# Patient Record
Sex: MALE | Race: WHITE | NOT HISPANIC OR LATINO | Employment: OTHER | URBAN - METROPOLITAN AREA
[De-identification: names, ages, dates, MRNs, and addresses within clinical notes are randomized per-mention and may not be internally consistent; named-entity substitution may affect disease eponyms.]

---

## 2017-04-27 ENCOUNTER — HOSPITAL ENCOUNTER (EMERGENCY)
Facility: HOSPITAL | Age: 42
Discharge: HOME | End: 2017-04-27

## 2017-04-27 VITALS
HEART RATE: 92 BPM | TEMPERATURE: 98.6 F | OXYGEN SATURATION: 98 % | RESPIRATION RATE: 18 BRPM | DIASTOLIC BLOOD PRESSURE: 96 MMHG | SYSTOLIC BLOOD PRESSURE: 148 MMHG

## 2017-04-27 DIAGNOSIS — J40 BRONCHITIS: ICD-10-CM

## 2017-04-27 DIAGNOSIS — S51.811A LACERATION OF RIGHT FOREARM, INITIAL ENCOUNTER: Primary | ICD-10-CM

## 2017-04-27 PROCEDURE — 90471 IMMUNIZATION ADMIN: CPT

## 2017-04-27 PROCEDURE — 99283 EMERGENCY DEPT VISIT LOW MDM: CPT

## 2017-04-27 PROCEDURE — 90715 TDAP VACCINE 7 YRS/> IM: CPT | Performed by: EMERGENCY MEDICINE

## 2017-04-27 RX ORDER — ALBUTEROL SULFATE 90 UG/1
2 AEROSOL, METERED RESPIRATORY (INHALATION) ONCE
Status: COMPLETED | OUTPATIENT
Start: 2017-04-27 | End: 2017-04-27

## 2017-04-27 RX ADMIN — ALBUTEROL SULFATE 2 PUFF: 90 AEROSOL, METERED RESPIRATORY (INHALATION) at 02:38

## 2017-04-27 RX ADMIN — TETANUS TOXOID, REDUCED DIPHTHERIA TOXOID AND ACELLULAR PERTUSSIS VACCINE, ADSORBED 0.5 ML: 5; 2.5; 8; 8; 2.5 SUSPENSION INTRAMUSCULAR at 02:36

## 2020-01-30 PROCEDURE — 88304 TISSUE EXAM BY PATHOLOGIST: CPT | Performed by: PATHOLOGY

## 2020-01-31 ENCOUNTER — LAB REQUISITION (OUTPATIENT)
Dept: LAB | Facility: HOSPITAL | Age: 45
End: 2020-01-31
Payer: COMMERCIAL

## 2020-01-31 DIAGNOSIS — L72.0 EPIDERMAL CYST: ICD-10-CM

## 2020-05-25 ENCOUNTER — HOSPITAL ENCOUNTER (EMERGENCY)
Facility: HOSPITAL | Age: 45
Discharge: HOME | End: 2020-05-25
Attending: EMERGENCY MEDICINE | Admitting: EMERGENCY MEDICINE
Payer: COMMERCIAL

## 2020-05-25 VITALS
HEART RATE: 83 BPM | SYSTOLIC BLOOD PRESSURE: 151 MMHG | TEMPERATURE: 97.4 F | RESPIRATION RATE: 16 BRPM | OXYGEN SATURATION: 99 % | DIASTOLIC BLOOD PRESSURE: 73 MMHG | WEIGHT: 312.61 LBS

## 2020-05-25 DIAGNOSIS — Z76.0 MEDICATION REFILL: Primary | ICD-10-CM

## 2020-05-25 PROCEDURE — 99281 EMR DPT VST MAYX REQ PHY/QHP: CPT

## 2020-05-25 PROCEDURE — 99281 EMR DPT VST MAYX REQ PHY/QHP: CPT | Performed by: EMERGENCY MEDICINE

## 2020-05-25 RX ORDER — METHADONE HYDROCHLORIDE 10 MG/ML
160 CONCENTRATE ORAL ONCE
Status: COMPLETED | OUTPATIENT
Start: 2020-05-25 | End: 2020-05-25

## 2020-05-25 RX ADMIN — METHADONE HYDROCHLORIDE 160 MG: 10 CONCENTRATE ORAL at 11:01

## 2020-08-17 ENCOUNTER — TELEPHONE (OUTPATIENT)
Dept: PSYCHIATRY | Facility: CLINIC | Age: 45
End: 2020-08-17

## 2020-11-03 ENCOUNTER — APPOINTMENT (EMERGENCY)
Dept: RADIOLOGY | Facility: HOSPITAL | Age: 45
DRG: 603 | End: 2020-11-03
Payer: COMMERCIAL

## 2020-11-03 ENCOUNTER — HOSPITAL ENCOUNTER (INPATIENT)
Facility: HOSPITAL | Age: 45
LOS: 3 days | Discharge: HOME | DRG: 603 | End: 2020-11-06
Attending: EMERGENCY MEDICINE | Admitting: INTERNAL MEDICINE
Payer: COMMERCIAL

## 2020-11-03 DIAGNOSIS — L03.90 CELLULITIS: Primary | ICD-10-CM

## 2020-11-03 PROBLEM — F32.A DEPRESSION: Chronic | Status: ACTIVE | Noted: 2020-11-03

## 2020-11-03 PROBLEM — L03.116 CELLULITIS OF LEFT LOWER EXTREMITY: Status: ACTIVE | Noted: 2020-11-03

## 2020-11-03 PROBLEM — Z72.0 TOBACCO USE: Chronic | Status: ACTIVE | Noted: 2020-11-03

## 2020-11-03 PROBLEM — E78.5 HYPERLIPIDEMIA: Chronic | Status: ACTIVE | Noted: 2020-11-03

## 2020-11-03 PROBLEM — I10 ESSENTIAL HYPERTENSION: Chronic | Status: ACTIVE | Noted: 2020-11-03

## 2020-11-03 PROBLEM — E66.01 CLASS 3 SEVERE OBESITY IN ADULT (HCC): Chronic | Status: ACTIVE | Noted: 2020-11-03

## 2020-11-03 PROBLEM — F11.20 OPIOID DEPENDENCE (HCC): Chronic | Status: ACTIVE | Noted: 2020-11-03

## 2020-11-03 LAB
ALBUMIN SERPL BCP-MCNC: 3.8 G/DL (ref 3.5–5)
ALP SERPL-CCNC: 68 U/L (ref 46–116)
ALT SERPL W P-5'-P-CCNC: 66 U/L (ref 12–78)
ANION GAP SERPL CALCULATED.3IONS-SCNC: 11 MMOL/L (ref 4–13)
AST SERPL W P-5'-P-CCNC: 35 U/L (ref 5–45)
BASOPHILS # BLD AUTO: 0.11 THOUSANDS/ΜL (ref 0–0.1)
BASOPHILS NFR BLD AUTO: 1 % (ref 0–1)
BILIRUB SERPL-MCNC: 0.35 MG/DL (ref 0.2–1)
BUN SERPL-MCNC: 14 MG/DL (ref 5–25)
CALCIUM SERPL-MCNC: 9.2 MG/DL (ref 8.3–10.1)
CHLORIDE SERPL-SCNC: 101 MMOL/L (ref 100–108)
CO2 SERPL-SCNC: 28 MMOL/L (ref 21–32)
CREAT SERPL-MCNC: 0.82 MG/DL (ref 0.6–1.3)
EOSINOPHIL # BLD AUTO: 0.36 THOUSAND/ΜL (ref 0–0.61)
EOSINOPHIL NFR BLD AUTO: 3 % (ref 0–6)
ERYTHROCYTE [DISTWIDTH] IN BLOOD BY AUTOMATED COUNT: 13.7 % (ref 11.6–15.1)
GFR SERPL CREATININE-BSD FRML MDRD: 107 ML/MIN/1.73SQ M
GLUCOSE SERPL-MCNC: 142 MG/DL (ref 65–140)
HCT VFR BLD AUTO: 42.9 % (ref 36.5–49.3)
HGB BLD-MCNC: 13.4 G/DL (ref 12–17)
IMM GRANULOCYTES # BLD AUTO: 0.19 THOUSAND/UL (ref 0–0.2)
IMM GRANULOCYTES NFR BLD AUTO: 1 % (ref 0–2)
LYMPHOCYTES # BLD AUTO: 3.64 THOUSANDS/ΜL (ref 0.6–4.47)
LYMPHOCYTES NFR BLD AUTO: 28 % (ref 14–44)
MCH RBC QN AUTO: 29.1 PG (ref 26.8–34.3)
MCHC RBC AUTO-ENTMCNC: 31.2 G/DL (ref 31.4–37.4)
MCV RBC AUTO: 93 FL (ref 82–98)
MONOCYTES # BLD AUTO: 0.95 THOUSAND/ΜL (ref 0.17–1.22)
MONOCYTES NFR BLD AUTO: 7 % (ref 4–12)
NEUTROPHILS # BLD AUTO: 7.87 THOUSANDS/ΜL (ref 1.85–7.62)
NEUTS SEG NFR BLD AUTO: 60 % (ref 43–75)
NRBC BLD AUTO-RTO: 0 /100 WBCS
NT-PROBNP SERPL-MCNC: 50 PG/ML
PLATELET # BLD AUTO: 347 THOUSANDS/UL (ref 149–390)
PMV BLD AUTO: 11 FL (ref 8.9–12.7)
POTASSIUM SERPL-SCNC: 3.6 MMOL/L (ref 3.5–5.3)
PROT SERPL-MCNC: 8.3 G/DL (ref 6.4–8.2)
RBC # BLD AUTO: 4.6 MILLION/UL (ref 3.88–5.62)
SODIUM SERPL-SCNC: 140 MMOL/L (ref 136–145)
TROPONIN I SERPL-MCNC: <0.02 NG/ML
WBC # BLD AUTO: 13.12 THOUSAND/UL (ref 4.31–10.16)

## 2020-11-03 PROCEDURE — 99285 EMERGENCY DEPT VISIT HI MDM: CPT

## 2020-11-03 PROCEDURE — 99223 1ST HOSP IP/OBS HIGH 75: CPT | Performed by: PHYSICIAN ASSISTANT

## 2020-11-03 PROCEDURE — 85025 COMPLETE CBC W/AUTO DIFF WBC: CPT | Performed by: EMERGENCY MEDICINE

## 2020-11-03 PROCEDURE — 93005 ELECTROCARDIOGRAM TRACING: CPT

## 2020-11-03 PROCEDURE — 99285 EMERGENCY DEPT VISIT HI MDM: CPT | Performed by: EMERGENCY MEDICINE

## 2020-11-03 PROCEDURE — 83880 ASSAY OF NATRIURETIC PEPTIDE: CPT | Performed by: EMERGENCY MEDICINE

## 2020-11-03 PROCEDURE — 36415 COLL VENOUS BLD VENIPUNCTURE: CPT | Performed by: EMERGENCY MEDICINE

## 2020-11-03 PROCEDURE — 80053 COMPREHEN METABOLIC PANEL: CPT | Performed by: EMERGENCY MEDICINE

## 2020-11-03 PROCEDURE — 84484 ASSAY OF TROPONIN QUANT: CPT | Performed by: EMERGENCY MEDICINE

## 2020-11-03 PROCEDURE — 87040 BLOOD CULTURE FOR BACTERIA: CPT | Performed by: EMERGENCY MEDICINE

## 2020-11-03 PROCEDURE — 71045 X-RAY EXAM CHEST 1 VIEW: CPT

## 2020-11-03 RX ORDER — LABETALOL 100 MG/1
100 TABLET, FILM COATED ORAL EVERY 8 HOURS SCHEDULED
Status: DISCONTINUED | OUTPATIENT
Start: 2020-11-03 | End: 2020-11-06 | Stop reason: HOSPADM

## 2020-11-03 RX ORDER — FENOFIBRATE 145 MG/1
145 TABLET, COATED ORAL DAILY
Status: DISCONTINUED | OUTPATIENT
Start: 2020-11-04 | End: 2020-11-06 | Stop reason: HOSPADM

## 2020-11-03 RX ORDER — BUPROPION HYDROCHLORIDE 100 MG/1
300 TABLET ORAL ONCE
COMMUNITY
End: 2022-04-23

## 2020-11-03 RX ORDER — LABETALOL 100 MG/1
100 TABLET, FILM COATED ORAL 2 TIMES DAILY
COMMUNITY

## 2020-11-03 RX ORDER — FENOFIBRATE 145 MG/1
145 TABLET, COATED ORAL DAILY
COMMUNITY

## 2020-11-03 RX ORDER — METHADONE HYDROCHLORIDE 10 MG/ML
160 CONCENTRATE ORAL DAILY
Status: DISCONTINUED | OUTPATIENT
Start: 2020-11-04 | End: 2020-11-03

## 2020-11-03 RX ORDER — LOSARTAN POTASSIUM 100 MG/1
TABLET ORAL DAILY
COMMUNITY

## 2020-11-03 RX ORDER — AMLODIPINE BESYLATE AND ATORVASTATIN CALCIUM 10; 10 MG/1; MG/1
1 TABLET, FILM COATED ORAL DAILY
COMMUNITY

## 2020-11-03 RX ORDER — METHADONE HYDROCHLORIDE 10 MG/ML
160 CONCENTRATE ORAL DAILY
Status: DISCONTINUED | OUTPATIENT
Start: 2020-11-04 | End: 2020-11-06 | Stop reason: HOSPADM

## 2020-11-03 RX ORDER — HYDROCHLOROTHIAZIDE 25 MG/1
25 TABLET ORAL DAILY
COMMUNITY

## 2020-11-03 RX ORDER — CLINDAMYCIN HYDROCHLORIDE 300 MG/1
300 CAPSULE ORAL 4 TIMES DAILY
COMMUNITY
End: 2020-11-06 | Stop reason: HOSPADM

## 2020-11-03 RX ORDER — ONDANSETRON 2 MG/ML
4 INJECTION INTRAMUSCULAR; INTRAVENOUS EVERY 6 HOURS PRN
Status: DISCONTINUED | OUTPATIENT
Start: 2020-11-03 | End: 2020-11-06 | Stop reason: HOSPADM

## 2020-11-03 RX ORDER — HYDROCHLOROTHIAZIDE 25 MG/1
25 TABLET ORAL DAILY
Status: DISCONTINUED | OUTPATIENT
Start: 2020-11-04 | End: 2020-11-06 | Stop reason: HOSPADM

## 2020-11-03 RX ORDER — SODIUM CHLORIDE 9 MG/ML
125 INJECTION, SOLUTION INTRAVENOUS CONTINUOUS
Status: DISCONTINUED | OUTPATIENT
Start: 2020-11-03 | End: 2020-11-06

## 2020-11-03 RX ORDER — BUPROPION HYDROCHLORIDE 100 MG/1
300 TABLET ORAL ONCE
Status: DISCONTINUED | OUTPATIENT
Start: 2020-11-03 | End: 2020-11-03

## 2020-11-03 RX ORDER — LOSARTAN POTASSIUM 50 MG/1
100 TABLET ORAL DAILY
Status: DISCONTINUED | OUTPATIENT
Start: 2020-11-04 | End: 2020-11-06 | Stop reason: HOSPADM

## 2020-11-03 RX ORDER — BUPROPION HYDROCHLORIDE 150 MG/1
300 TABLET ORAL DAILY
Status: DISCONTINUED | OUTPATIENT
Start: 2020-11-04 | End: 2020-11-06 | Stop reason: HOSPADM

## 2020-11-03 RX ORDER — VENLAFAXINE HYDROCHLORIDE 150 MG/1
150 CAPSULE, EXTENDED RELEASE ORAL DAILY
COMMUNITY

## 2020-11-03 RX ORDER — ACETAMINOPHEN 325 MG/1
650 TABLET ORAL EVERY 6 HOURS PRN
Status: DISCONTINUED | OUTPATIENT
Start: 2020-11-03 | End: 2020-11-06 | Stop reason: HOSPADM

## 2020-11-03 RX ORDER — VENLAFAXINE HYDROCHLORIDE 75 MG/1
75 CAPSULE, EXTENDED RELEASE ORAL DAILY
COMMUNITY

## 2020-11-03 RX ORDER — METHADONE HYDROCHLORIDE 40 MG/1
160 TABLET ORAL ONCE
COMMUNITY

## 2020-11-03 RX ADMIN — CEFAZOLIN SODIUM 2000 MG: 10 INJECTION, POWDER, FOR SOLUTION INTRAVENOUS at 18:20

## 2020-11-03 RX ADMIN — SODIUM CHLORIDE 125 ML/HR: 0.9 INJECTION, SOLUTION INTRAVENOUS at 21:33

## 2020-11-03 RX ADMIN — ENOXAPARIN SODIUM 40 MG: 40 INJECTION SUBCUTANEOUS at 21:30

## 2020-11-03 RX ADMIN — LABETALOL HYDROCHLORIDE 100 MG: 100 TABLET, FILM COATED ORAL at 21:30

## 2020-11-04 PROBLEM — E78.5 DYSLIPIDEMIA: Status: ACTIVE | Noted: 2020-11-03

## 2020-11-04 LAB
ANION GAP SERPL CALCULATED.3IONS-SCNC: 9 MMOL/L (ref 4–13)
ATRIAL RATE: 81 BPM
BASOPHILS # BLD AUTO: 0.11 THOUSANDS/ΜL (ref 0–0.1)
BASOPHILS NFR BLD AUTO: 1 % (ref 0–1)
BUN SERPL-MCNC: 11 MG/DL (ref 5–25)
CALCIUM SERPL-MCNC: 8.7 MG/DL (ref 8.3–10.1)
CHLORIDE SERPL-SCNC: 103 MMOL/L (ref 100–108)
CO2 SERPL-SCNC: 29 MMOL/L (ref 21–32)
CREAT SERPL-MCNC: 0.81 MG/DL (ref 0.6–1.3)
EOSINOPHIL # BLD AUTO: 0.27 THOUSAND/ΜL (ref 0–0.61)
EOSINOPHIL NFR BLD AUTO: 3 % (ref 0–6)
ERYTHROCYTE [DISTWIDTH] IN BLOOD BY AUTOMATED COUNT: 13.7 % (ref 11.6–15.1)
GFR SERPL CREATININE-BSD FRML MDRD: 107 ML/MIN/1.73SQ M
GLUCOSE SERPL-MCNC: 129 MG/DL (ref 65–140)
HCT VFR BLD AUTO: 41 % (ref 36.5–49.3)
HGB BLD-MCNC: 12.8 G/DL (ref 12–17)
IMM GRANULOCYTES # BLD AUTO: 0.15 THOUSAND/UL (ref 0–0.2)
IMM GRANULOCYTES NFR BLD AUTO: 1 % (ref 0–2)
LYMPHOCYTES # BLD AUTO: 3.15 THOUSANDS/ΜL (ref 0.6–4.47)
LYMPHOCYTES NFR BLD AUTO: 29 % (ref 14–44)
MCH RBC QN AUTO: 29.2 PG (ref 26.8–34.3)
MCHC RBC AUTO-ENTMCNC: 31.2 G/DL (ref 31.4–37.4)
MCV RBC AUTO: 94 FL (ref 82–98)
MONOCYTES # BLD AUTO: 1 THOUSAND/ΜL (ref 0.17–1.22)
MONOCYTES NFR BLD AUTO: 9 % (ref 4–12)
NEUTROPHILS # BLD AUTO: 6.07 THOUSANDS/ΜL (ref 1.85–7.62)
NEUTS SEG NFR BLD AUTO: 57 % (ref 43–75)
NRBC BLD AUTO-RTO: 0 /100 WBCS
P AXIS: 39 DEGREES
PLATELET # BLD AUTO: 308 THOUSANDS/UL (ref 149–390)
PMV BLD AUTO: 11.5 FL (ref 8.9–12.7)
POTASSIUM SERPL-SCNC: 3.6 MMOL/L (ref 3.5–5.3)
PR INTERVAL: 144 MS
QRS AXIS: -4 DEGREES
QRSD INTERVAL: 98 MS
QT INTERVAL: 356 MS
QTC INTERVAL: 403 MS
RBC # BLD AUTO: 4.38 MILLION/UL (ref 3.88–5.62)
SODIUM SERPL-SCNC: 141 MMOL/L (ref 136–145)
T WAVE AXIS: 37 DEGREES
VENTRICULAR RATE: 77 BPM
WBC # BLD AUTO: 10.75 THOUSAND/UL (ref 4.31–10.16)

## 2020-11-04 PROCEDURE — 99232 SBSQ HOSP IP/OBS MODERATE 35: CPT | Performed by: INTERNAL MEDICINE

## 2020-11-04 PROCEDURE — 80048 BASIC METABOLIC PNL TOTAL CA: CPT | Performed by: PHYSICIAN ASSISTANT

## 2020-11-04 PROCEDURE — 93010 ELECTROCARDIOGRAM REPORT: CPT | Performed by: INTERNAL MEDICINE

## 2020-11-04 PROCEDURE — 85025 COMPLETE CBC W/AUTO DIFF WBC: CPT | Performed by: PHYSICIAN ASSISTANT

## 2020-11-04 RX ADMIN — METHADONE HYDROCHLORIDE 160 MG: 10 CONCENTRATE ORAL at 08:16

## 2020-11-04 RX ADMIN — AMLODIPINE BESYLATE: 10 TABLET ORAL at 08:19

## 2020-11-04 RX ADMIN — ACETAMINOPHEN 650 MG: 325 TABLET, FILM COATED ORAL at 00:33

## 2020-11-04 RX ADMIN — Medication 2000 MG: at 09:47

## 2020-11-04 RX ADMIN — LABETALOL HYDROCHLORIDE 100 MG: 100 TABLET, FILM COATED ORAL at 22:54

## 2020-11-04 RX ADMIN — Medication 2000 MG: at 18:04

## 2020-11-04 RX ADMIN — SODIUM CHLORIDE 125 ML/HR: 0.9 INJECTION, SOLUTION INTRAVENOUS at 05:34

## 2020-11-04 RX ADMIN — Medication 2000 MG: at 02:11

## 2020-11-04 RX ADMIN — ENOXAPARIN SODIUM 40 MG: 40 INJECTION SUBCUTANEOUS at 18:05

## 2020-11-04 RX ADMIN — LOSARTAN POTASSIUM 100 MG: 50 TABLET, FILM COATED ORAL at 08:19

## 2020-11-04 RX ADMIN — ENOXAPARIN SODIUM 40 MG: 40 INJECTION SUBCUTANEOUS at 08:18

## 2020-11-04 RX ADMIN — SODIUM CHLORIDE 125 ML/HR: 0.9 INJECTION, SOLUTION INTRAVENOUS at 14:08

## 2020-11-04 RX ADMIN — LABETALOL HYDROCHLORIDE 100 MG: 100 TABLET, FILM COATED ORAL at 05:41

## 2020-11-04 RX ADMIN — BUPROPION HYDROCHLORIDE 300 MG: 150 TABLET, FILM COATED, EXTENDED RELEASE ORAL at 08:20

## 2020-11-04 RX ADMIN — FENOFIBRATE 145 MG: 145 TABLET ORAL at 08:19

## 2020-11-04 RX ADMIN — HYDROCHLOROTHIAZIDE 25 MG: 25 TABLET ORAL at 08:20

## 2020-11-04 RX ADMIN — SODIUM CHLORIDE 125 ML/HR: 0.9 INJECTION, SOLUTION INTRAVENOUS at 22:57

## 2020-11-04 RX ADMIN — LABETALOL HYDROCHLORIDE 100 MG: 100 TABLET, FILM COATED ORAL at 14:09

## 2020-11-04 RX ADMIN — VENLAFAXINE HYDROCHLORIDE 225 MG: 37.5 CAPSULE, EXTENDED RELEASE ORAL at 08:20

## 2020-11-05 LAB
BASOPHILS # BLD AUTO: 0.09 THOUSANDS/ΜL (ref 0–0.1)
BASOPHILS NFR BLD AUTO: 1 % (ref 0–1)
EOSINOPHIL # BLD AUTO: 0.25 THOUSAND/ΜL (ref 0–0.61)
EOSINOPHIL NFR BLD AUTO: 3 % (ref 0–6)
ERYTHROCYTE [DISTWIDTH] IN BLOOD BY AUTOMATED COUNT: 13.6 % (ref 11.6–15.1)
HCT VFR BLD AUTO: 40.4 % (ref 36.5–49.3)
HGB BLD-MCNC: 12.4 G/DL (ref 12–17)
IMM GRANULOCYTES # BLD AUTO: 0.12 THOUSAND/UL (ref 0–0.2)
IMM GRANULOCYTES NFR BLD AUTO: 1 % (ref 0–2)
LYMPHOCYTES # BLD AUTO: 3.51 THOUSANDS/ΜL (ref 0.6–4.47)
LYMPHOCYTES NFR BLD AUTO: 37 % (ref 14–44)
MCH RBC QN AUTO: 28.7 PG (ref 26.8–34.3)
MCHC RBC AUTO-ENTMCNC: 30.7 G/DL (ref 31.4–37.4)
MCV RBC AUTO: 94 FL (ref 82–98)
MONOCYTES # BLD AUTO: 0.9 THOUSAND/ΜL (ref 0.17–1.22)
MONOCYTES NFR BLD AUTO: 10 % (ref 4–12)
NEUTROPHILS # BLD AUTO: 4.62 THOUSANDS/ΜL (ref 1.85–7.62)
NEUTS SEG NFR BLD AUTO: 48 % (ref 43–75)
NRBC BLD AUTO-RTO: 0 /100 WBCS
PLATELET # BLD AUTO: 311 THOUSANDS/UL (ref 149–390)
PMV BLD AUTO: 11.1 FL (ref 8.9–12.7)
RBC # BLD AUTO: 4.32 MILLION/UL (ref 3.88–5.62)
WBC # BLD AUTO: 9.49 THOUSAND/UL (ref 4.31–10.16)

## 2020-11-05 PROCEDURE — 99232 SBSQ HOSP IP/OBS MODERATE 35: CPT | Performed by: INTERNAL MEDICINE

## 2020-11-05 PROCEDURE — 85025 COMPLETE CBC W/AUTO DIFF WBC: CPT | Performed by: INTERNAL MEDICINE

## 2020-11-05 RX ADMIN — LABETALOL HYDROCHLORIDE 100 MG: 100 TABLET, FILM COATED ORAL at 21:51

## 2020-11-05 RX ADMIN — ENOXAPARIN SODIUM 40 MG: 40 INJECTION SUBCUTANEOUS at 17:21

## 2020-11-05 RX ADMIN — LABETALOL HYDROCHLORIDE 100 MG: 100 TABLET, FILM COATED ORAL at 06:08

## 2020-11-05 RX ADMIN — FENOFIBRATE 145 MG: 145 TABLET ORAL at 08:46

## 2020-11-05 RX ADMIN — VENLAFAXINE HYDROCHLORIDE 225 MG: 37.5 CAPSULE, EXTENDED RELEASE ORAL at 08:46

## 2020-11-05 RX ADMIN — AMLODIPINE BESYLATE: 10 TABLET ORAL at 08:45

## 2020-11-05 RX ADMIN — SODIUM CHLORIDE 125 ML/HR: 0.9 INJECTION, SOLUTION INTRAVENOUS at 16:28

## 2020-11-05 RX ADMIN — LOSARTAN POTASSIUM 100 MG: 50 TABLET, FILM COATED ORAL at 08:46

## 2020-11-05 RX ADMIN — BUPROPION HYDROCHLORIDE 300 MG: 150 TABLET, FILM COATED, EXTENDED RELEASE ORAL at 08:46

## 2020-11-05 RX ADMIN — LABETALOL HYDROCHLORIDE 100 MG: 100 TABLET, FILM COATED ORAL at 15:52

## 2020-11-05 RX ADMIN — ENOXAPARIN SODIUM 40 MG: 40 INJECTION SUBCUTANEOUS at 08:46

## 2020-11-05 RX ADMIN — Medication 2000 MG: at 12:32

## 2020-11-05 RX ADMIN — Medication 2000 MG: at 19:52

## 2020-11-05 RX ADMIN — SODIUM CHLORIDE 125 ML/HR: 0.9 INJECTION, SOLUTION INTRAVENOUS at 07:46

## 2020-11-05 RX ADMIN — HYDROCHLOROTHIAZIDE 25 MG: 25 TABLET ORAL at 08:46

## 2020-11-05 RX ADMIN — METHADONE HYDROCHLORIDE 160 MG: 10 CONCENTRATE ORAL at 08:46

## 2020-11-05 RX ADMIN — SODIUM CHLORIDE 125 ML/HR: 0.9 INJECTION, SOLUTION INTRAVENOUS at 23:59

## 2020-11-05 RX ADMIN — Medication 2000 MG: at 01:47

## 2020-11-06 VITALS
RESPIRATION RATE: 18 BRPM | WEIGHT: 315 LBS | SYSTOLIC BLOOD PRESSURE: 140 MMHG | BODY MASS INDEX: 44.1 KG/M2 | OXYGEN SATURATION: 95 % | HEART RATE: 63 BPM | TEMPERATURE: 98 F | DIASTOLIC BLOOD PRESSURE: 71 MMHG | HEIGHT: 71 IN

## 2020-11-06 LAB
BASOPHILS # BLD AUTO: 0.1 THOUSANDS/ΜL (ref 0–0.1)
BASOPHILS NFR BLD AUTO: 1 % (ref 0–1)
EOSINOPHIL # BLD AUTO: 0.27 THOUSAND/ΜL (ref 0–0.61)
EOSINOPHIL NFR BLD AUTO: 3 % (ref 0–6)
ERYTHROCYTE [DISTWIDTH] IN BLOOD BY AUTOMATED COUNT: 13.5 % (ref 11.6–15.1)
EST. AVERAGE GLUCOSE BLD GHB EST-MCNC: 154 MG/DL
HBA1C MFR BLD: 7 %
HCT VFR BLD AUTO: 39.1 % (ref 36.5–49.3)
HGB BLD-MCNC: 12.1 G/DL (ref 12–17)
IMM GRANULOCYTES # BLD AUTO: 0.11 THOUSAND/UL (ref 0–0.2)
IMM GRANULOCYTES NFR BLD AUTO: 1 % (ref 0–2)
LYMPHOCYTES # BLD AUTO: 3.91 THOUSANDS/ΜL (ref 0.6–4.47)
LYMPHOCYTES NFR BLD AUTO: 41 % (ref 14–44)
MCH RBC QN AUTO: 29.1 PG (ref 26.8–34.3)
MCHC RBC AUTO-ENTMCNC: 30.9 G/DL (ref 31.4–37.4)
MCV RBC AUTO: 94 FL (ref 82–98)
MONOCYTES # BLD AUTO: 0.9 THOUSAND/ΜL (ref 0.17–1.22)
MONOCYTES NFR BLD AUTO: 9 % (ref 4–12)
NEUTROPHILS # BLD AUTO: 4.3 THOUSANDS/ΜL (ref 1.85–7.62)
NEUTS SEG NFR BLD AUTO: 45 % (ref 43–75)
NRBC BLD AUTO-RTO: 0 /100 WBCS
PLATELET # BLD AUTO: 310 THOUSANDS/UL (ref 149–390)
PMV BLD AUTO: 10.9 FL (ref 8.9–12.7)
RBC # BLD AUTO: 4.16 MILLION/UL (ref 3.88–5.62)
WBC # BLD AUTO: 9.59 THOUSAND/UL (ref 4.31–10.16)

## 2020-11-06 PROCEDURE — 85025 COMPLETE CBC W/AUTO DIFF WBC: CPT | Performed by: INTERNAL MEDICINE

## 2020-11-06 PROCEDURE — 99239 HOSP IP/OBS DSCHRG MGMT >30: CPT | Performed by: INTERNAL MEDICINE

## 2020-11-06 PROCEDURE — 83036 HEMOGLOBIN GLYCOSYLATED A1C: CPT | Performed by: INTERNAL MEDICINE

## 2020-11-06 RX ORDER — CEPHALEXIN 500 MG/1
1000 CAPSULE ORAL EVERY 6 HOURS SCHEDULED
Qty: 28 CAPSULE | Refills: 0 | Status: SHIPPED | OUTPATIENT
Start: 2020-11-06 | End: 2020-11-13

## 2020-11-06 RX ORDER — CEPHALEXIN 500 MG/1
500 CAPSULE ORAL EVERY 6 HOURS SCHEDULED
Qty: 28 CAPSULE | Refills: 0 | Status: SHIPPED | OUTPATIENT
Start: 2020-11-06 | End: 2020-11-06

## 2020-11-06 RX ORDER — CEPHALEXIN 500 MG/1
500 CAPSULE ORAL EVERY 6 HOURS SCHEDULED
Status: DISCONTINUED | OUTPATIENT
Start: 2020-11-06 | End: 2020-11-06 | Stop reason: HOSPADM

## 2020-11-06 RX ADMIN — SODIUM CHLORIDE 125 ML/HR: 0.9 INJECTION, SOLUTION INTRAVENOUS at 10:57

## 2020-11-06 RX ADMIN — FENOFIBRATE 145 MG: 145 TABLET ORAL at 09:37

## 2020-11-06 RX ADMIN — BUPROPION HYDROCHLORIDE 300 MG: 150 TABLET, FILM COATED, EXTENDED RELEASE ORAL at 09:37

## 2020-11-06 RX ADMIN — HYDROCHLOROTHIAZIDE 25 MG: 25 TABLET ORAL at 09:37

## 2020-11-06 RX ADMIN — AMLODIPINE BESYLATE: 10 TABLET ORAL at 09:37

## 2020-11-06 RX ADMIN — VENLAFAXINE HYDROCHLORIDE 225 MG: 37.5 CAPSULE, EXTENDED RELEASE ORAL at 09:37

## 2020-11-06 RX ADMIN — METHADONE HYDROCHLORIDE 160 MG: 10 CONCENTRATE ORAL at 09:37

## 2020-11-06 RX ADMIN — Medication 2000 MG: at 12:20

## 2020-11-06 RX ADMIN — LABETALOL HYDROCHLORIDE 100 MG: 100 TABLET, FILM COATED ORAL at 05:43

## 2020-11-06 RX ADMIN — ENOXAPARIN SODIUM 40 MG: 40 INJECTION SUBCUTANEOUS at 09:37

## 2020-11-06 RX ADMIN — Medication 2000 MG: at 04:34

## 2020-11-06 RX ADMIN — LOSARTAN POTASSIUM 100 MG: 50 TABLET, FILM COATED ORAL at 09:37

## 2020-11-08 LAB
BACTERIA BLD CULT: NORMAL
BACTERIA BLD CULT: NORMAL

## 2020-11-17 ENCOUNTER — HOSPITAL ENCOUNTER (INPATIENT)
Facility: HOSPITAL | Age: 45
LOS: 3 days | Discharge: HOME | DRG: 872 | End: 2020-11-20
Attending: EMERGENCY MEDICINE | Admitting: HOSPITALIST
Payer: COMMERCIAL

## 2020-11-17 ENCOUNTER — APPOINTMENT (INPATIENT)
Dept: ULTRASOUND IMAGING | Facility: HOSPITAL | Age: 45
DRG: 872 | End: 2020-11-17
Payer: COMMERCIAL

## 2020-11-17 DIAGNOSIS — A41.9 SEPSIS, DUE TO UNSPECIFIED ORGANISM, UNSPECIFIED WHETHER ACUTE ORGAN DYSFUNCTION PRESENT (HCC): Primary | ICD-10-CM

## 2020-11-17 DIAGNOSIS — L84 CALLUS OF HEEL: ICD-10-CM

## 2020-11-17 DIAGNOSIS — L03.116 CELLULITIS OF LEFT LOWER EXTREMITY: ICD-10-CM

## 2020-11-17 DIAGNOSIS — L03.116 LEFT LEG CELLULITIS: ICD-10-CM

## 2020-11-17 PROBLEM — E11.9 TYPE 2 DIABETES MELLITUS, WITHOUT LONG-TERM CURRENT USE OF INSULIN (HCC): Status: ACTIVE | Noted: 2020-11-17

## 2020-11-17 PROBLEM — E66.01 CLASS 3 SEVERE OBESITY WITH BODY MASS INDEX (BMI) OF 40.0 TO 44.9 IN ADULT (HCC): Status: ACTIVE | Noted: 2020-11-03

## 2020-11-17 PROBLEM — I10 ESSENTIAL HYPERTENSION: Status: ACTIVE | Noted: 2020-11-03

## 2020-11-17 LAB
ALBUMIN SERPL BCP-MCNC: 3.9 G/DL (ref 3.5–5)
ALP SERPL-CCNC: 69 U/L (ref 46–116)
ALT SERPL W P-5'-P-CCNC: 65 U/L (ref 12–78)
ANION GAP SERPL CALCULATED.3IONS-SCNC: 11 MMOL/L (ref 4–13)
APTT PPP: 42 SECONDS (ref 23–37)
AST SERPL W P-5'-P-CCNC: 37 U/L (ref 5–45)
BASOPHILS # BLD AUTO: 0.11 THOUSANDS/ΜL (ref 0–0.1)
BASOPHILS NFR BLD AUTO: 1 % (ref 0–1)
BILIRUB SERPL-MCNC: 0.47 MG/DL (ref 0.2–1)
BUN SERPL-MCNC: 12 MG/DL (ref 5–25)
CALCIUM SERPL-MCNC: 9.1 MG/DL (ref 8.3–10.1)
CHLORIDE SERPL-SCNC: 102 MMOL/L (ref 100–108)
CO2 SERPL-SCNC: 29 MMOL/L (ref 21–32)
CREAT SERPL-MCNC: 0.86 MG/DL (ref 0.6–1.3)
EOSINOPHIL # BLD AUTO: 0.26 THOUSAND/ΜL (ref 0–0.61)
EOSINOPHIL NFR BLD AUTO: 2 % (ref 0–6)
ERYTHROCYTE [DISTWIDTH] IN BLOOD BY AUTOMATED COUNT: 13.4 % (ref 11.6–15.1)
GFR SERPL CREATININE-BSD FRML MDRD: 105 ML/MIN/1.73SQ M
GLUCOSE SERPL-MCNC: 152 MG/DL (ref 65–140)
HCT VFR BLD AUTO: 42.3 % (ref 36.5–49.3)
HGB BLD-MCNC: 13.1 G/DL (ref 12–17)
IMM GRANULOCYTES # BLD AUTO: 0.16 THOUSAND/UL (ref 0–0.2)
IMM GRANULOCYTES NFR BLD AUTO: 1 % (ref 0–2)
INR PPP: 1.08 (ref 0.84–1.19)
LACTATE SERPL-SCNC: 1.5 MMOL/L (ref 0.5–2)
LYMPHOCYTES # BLD AUTO: 3.13 THOUSANDS/ΜL (ref 0.6–4.47)
LYMPHOCYTES NFR BLD AUTO: 22 % (ref 14–44)
MCH RBC QN AUTO: 28.6 PG (ref 26.8–34.3)
MCHC RBC AUTO-ENTMCNC: 31 G/DL (ref 31.4–37.4)
MCV RBC AUTO: 92 FL (ref 82–98)
MONOCYTES # BLD AUTO: 0.88 THOUSAND/ΜL (ref 0.17–1.22)
MONOCYTES NFR BLD AUTO: 6 % (ref 4–12)
NEUTROPHILS # BLD AUTO: 9.69 THOUSANDS/ΜL (ref 1.85–7.62)
NEUTS SEG NFR BLD AUTO: 68 % (ref 43–75)
NRBC BLD AUTO-RTO: 0 /100 WBCS
PLATELET # BLD AUTO: 387 THOUSANDS/UL (ref 149–390)
PMV BLD AUTO: 10.9 FL (ref 8.9–12.7)
POTASSIUM SERPL-SCNC: 3.6 MMOL/L (ref 3.5–5.3)
PROT SERPL-MCNC: 8.3 G/DL (ref 6.4–8.2)
PROTHROMBIN TIME: 14.1 SECONDS (ref 11.6–14.5)
RBC # BLD AUTO: 4.58 MILLION/UL (ref 3.88–5.62)
SODIUM SERPL-SCNC: 142 MMOL/L (ref 136–145)
WBC # BLD AUTO: 14.23 THOUSAND/UL (ref 4.31–10.16)

## 2020-11-17 PROCEDURE — 85025 COMPLETE CBC W/AUTO DIFF WBC: CPT | Performed by: EMERGENCY MEDICINE

## 2020-11-17 PROCEDURE — 85730 THROMBOPLASTIN TIME PARTIAL: CPT | Performed by: EMERGENCY MEDICINE

## 2020-11-17 PROCEDURE — 85610 PROTHROMBIN TIME: CPT | Performed by: EMERGENCY MEDICINE

## 2020-11-17 PROCEDURE — 99285 EMERGENCY DEPT VISIT HI MDM: CPT

## 2020-11-17 PROCEDURE — 83605 ASSAY OF LACTIC ACID: CPT | Performed by: EMERGENCY MEDICINE

## 2020-11-17 PROCEDURE — 80053 COMPREHEN METABOLIC PANEL: CPT | Performed by: EMERGENCY MEDICINE

## 2020-11-17 PROCEDURE — 36415 COLL VENOUS BLD VENIPUNCTURE: CPT | Performed by: EMERGENCY MEDICINE

## 2020-11-17 PROCEDURE — 99285 EMERGENCY DEPT VISIT HI MDM: CPT | Performed by: EMERGENCY MEDICINE

## 2020-11-17 PROCEDURE — 93971 EXTREMITY STUDY: CPT

## 2020-11-17 PROCEDURE — 87040 BLOOD CULTURE FOR BACTERIA: CPT | Performed by: EMERGENCY MEDICINE

## 2020-11-17 PROCEDURE — 96365 THER/PROPH/DIAG IV INF INIT: CPT

## 2020-11-17 PROCEDURE — 93971 EXTREMITY STUDY: CPT | Performed by: SURGERY

## 2020-11-17 RX ORDER — HYDROCHLOROTHIAZIDE 25 MG/1
25 TABLET ORAL DAILY
Status: DISCONTINUED | OUTPATIENT
Start: 2020-11-18 | End: 2020-11-20 | Stop reason: HOSPADM

## 2020-11-17 RX ORDER — FENOFIBRATE 145 MG/1
145 TABLET, COATED ORAL DAILY
Status: DISCONTINUED | OUTPATIENT
Start: 2020-11-18 | End: 2020-11-20 | Stop reason: HOSPADM

## 2020-11-17 RX ORDER — CEFAZOLIN SODIUM 2 G/50ML
2000 SOLUTION INTRAVENOUS EVERY 8 HOURS
Status: DISCONTINUED | OUTPATIENT
Start: 2020-11-18 | End: 2020-11-18

## 2020-11-17 RX ORDER — LABETALOL 100 MG/1
100 TABLET, FILM COATED ORAL 3 TIMES DAILY
Status: DISCONTINUED | OUTPATIENT
Start: 2020-11-17 | End: 2020-11-20 | Stop reason: HOSPADM

## 2020-11-17 RX ORDER — NICOTINE 21 MG/24HR
1 PATCH, TRANSDERMAL 24 HOURS TRANSDERMAL DAILY
Status: DISCONTINUED | OUTPATIENT
Start: 2020-11-18 | End: 2020-11-20 | Stop reason: HOSPADM

## 2020-11-17 RX ORDER — LOSARTAN POTASSIUM 50 MG/1
100 TABLET ORAL DAILY
Status: DISCONTINUED | OUTPATIENT
Start: 2020-11-18 | End: 2020-11-20 | Stop reason: HOSPADM

## 2020-11-17 RX ORDER — BUPROPION HYDROCHLORIDE 150 MG/1
300 TABLET ORAL DAILY
Status: DISCONTINUED | OUTPATIENT
Start: 2020-11-18 | End: 2020-11-20 | Stop reason: HOSPADM

## 2020-11-17 RX ORDER — METHADONE HYDROCHLORIDE 10 MG/ML
160 CONCENTRATE ORAL DAILY
Status: DISCONTINUED | OUTPATIENT
Start: 2020-11-18 | End: 2020-11-20 | Stop reason: HOSPADM

## 2020-11-17 RX ORDER — VENLAFAXINE HYDROCHLORIDE 37.5 MG/1
75 CAPSULE, EXTENDED RELEASE ORAL DAILY
Status: DISCONTINUED | OUTPATIENT
Start: 2020-11-18 | End: 2020-11-20 | Stop reason: HOSPADM

## 2020-11-17 RX ORDER — VENLAFAXINE HYDROCHLORIDE 150 MG/1
150 CAPSULE, EXTENDED RELEASE ORAL DAILY
Status: DISCONTINUED | OUTPATIENT
Start: 2020-11-18 | End: 2020-11-20 | Stop reason: HOSPADM

## 2020-11-17 RX ADMIN — CEFTRIAXONE 1000 MG: 10 INJECTION, POWDER, FOR SOLUTION INTRAVENOUS at 17:33

## 2020-11-18 ENCOUNTER — APPOINTMENT (INPATIENT)
Dept: RADIOLOGY | Facility: HOSPITAL | Age: 45
DRG: 872 | End: 2020-11-18
Payer: COMMERCIAL

## 2020-11-18 PROBLEM — A41.9 SEPSIS (HCC): Status: ACTIVE | Noted: 2020-11-18

## 2020-11-18 LAB
ANION GAP SERPL CALCULATED.3IONS-SCNC: 8 MMOL/L (ref 4–13)
BASOPHILS # BLD AUTO: 0.1 THOUSANDS/ΜL (ref 0–0.1)
BASOPHILS NFR BLD AUTO: 1 % (ref 0–1)
BUN SERPL-MCNC: 14 MG/DL (ref 5–25)
CALCIUM SERPL-MCNC: 8.9 MG/DL (ref 8.3–10.1)
CHLORIDE SERPL-SCNC: 102 MMOL/L (ref 100–108)
CO2 SERPL-SCNC: 30 MMOL/L (ref 21–32)
CREAT SERPL-MCNC: 0.88 MG/DL (ref 0.6–1.3)
EOSINOPHIL # BLD AUTO: 0.29 THOUSAND/ΜL (ref 0–0.61)
EOSINOPHIL NFR BLD AUTO: 3 % (ref 0–6)
ERYTHROCYTE [DISTWIDTH] IN BLOOD BY AUTOMATED COUNT: 13.5 % (ref 11.6–15.1)
GFR SERPL CREATININE-BSD FRML MDRD: 104 ML/MIN/1.73SQ M
GLUCOSE SERPL-MCNC: 100 MG/DL (ref 65–140)
GLUCOSE SERPL-MCNC: 120 MG/DL (ref 65–140)
GLUCOSE SERPL-MCNC: 127 MG/DL (ref 65–140)
GLUCOSE SERPL-MCNC: 128 MG/DL (ref 65–140)
GLUCOSE SERPL-MCNC: 134 MG/DL (ref 65–140)
GLUCOSE SERPL-MCNC: 157 MG/DL (ref 65–140)
GLUCOSE SERPL-MCNC: 61 MG/DL (ref 65–140)
HCT VFR BLD AUTO: 40.6 % (ref 36.5–49.3)
HGB BLD-MCNC: 12.5 G/DL (ref 12–17)
IMM GRANULOCYTES # BLD AUTO: 0.12 THOUSAND/UL (ref 0–0.2)
IMM GRANULOCYTES NFR BLD AUTO: 1 % (ref 0–2)
LYMPHOCYTES # BLD AUTO: 3.42 THOUSANDS/ΜL (ref 0.6–4.47)
LYMPHOCYTES NFR BLD AUTO: 30 % (ref 14–44)
MCH RBC QN AUTO: 29 PG (ref 26.8–34.3)
MCHC RBC AUTO-ENTMCNC: 30.8 G/DL (ref 31.4–37.4)
MCV RBC AUTO: 94 FL (ref 82–98)
MONOCYTES # BLD AUTO: 0.96 THOUSAND/ΜL (ref 0.17–1.22)
MONOCYTES NFR BLD AUTO: 9 % (ref 4–12)
NEUTROPHILS # BLD AUTO: 6.45 THOUSANDS/ΜL (ref 1.85–7.62)
NEUTS SEG NFR BLD AUTO: 56 % (ref 43–75)
NRBC BLD AUTO-RTO: 0 /100 WBCS
PLATELET # BLD AUTO: 352 THOUSANDS/UL (ref 149–390)
PMV BLD AUTO: 11.2 FL (ref 8.9–12.7)
POTASSIUM SERPL-SCNC: 3.2 MMOL/L (ref 3.5–5.3)
RBC # BLD AUTO: 4.31 MILLION/UL (ref 3.88–5.62)
SODIUM SERPL-SCNC: 140 MMOL/L (ref 136–145)
WBC # BLD AUTO: 11.34 THOUSAND/UL (ref 4.31–10.16)

## 2020-11-18 PROCEDURE — 80048 BASIC METABOLIC PNL TOTAL CA: CPT | Performed by: INTERNAL MEDICINE

## 2020-11-18 PROCEDURE — 73620 X-RAY EXAM OF FOOT: CPT

## 2020-11-18 PROCEDURE — 99222 1ST HOSP IP/OBS MODERATE 55: CPT | Performed by: INTERNAL MEDICINE

## 2020-11-18 PROCEDURE — 85025 COMPLETE CBC W/AUTO DIFF WBC: CPT | Performed by: INTERNAL MEDICINE

## 2020-11-18 PROCEDURE — 82948 REAGENT STRIP/BLOOD GLUCOSE: CPT

## 2020-11-18 PROCEDURE — 99222 1ST HOSP IP/OBS MODERATE 55: CPT | Performed by: PODIATRIST

## 2020-11-18 PROCEDURE — 73600 X-RAY EXAM OF ANKLE: CPT

## 2020-11-18 PROCEDURE — 87081 CULTURE SCREEN ONLY: CPT | Performed by: PHYSICIAN ASSISTANT

## 2020-11-18 RX ORDER — AMMONIUM LACTATE 12 G/100G
CREAM TOPICAL 2 TIMES DAILY
Status: DISCONTINUED | OUTPATIENT
Start: 2020-11-18 | End: 2020-11-20 | Stop reason: HOSPADM

## 2020-11-18 RX ORDER — POTASSIUM CHLORIDE 20 MEQ/1
40 TABLET, EXTENDED RELEASE ORAL 2 TIMES DAILY
Status: DISCONTINUED | OUTPATIENT
Start: 2020-11-18 | End: 2020-11-18

## 2020-11-18 RX ORDER — POTASSIUM CHLORIDE 20 MEQ/1
40 TABLET, EXTENDED RELEASE ORAL 2 TIMES DAILY
Status: COMPLETED | OUTPATIENT
Start: 2020-11-18 | End: 2020-11-18

## 2020-11-18 RX ADMIN — VANCOMYCIN HYDROCHLORIDE 2000 MG: 5 INJECTION, POWDER, LYOPHILIZED, FOR SOLUTION INTRAVENOUS at 15:37

## 2020-11-18 RX ADMIN — AMLODIPINE BESYLATE: 10 TABLET ORAL at 08:48

## 2020-11-18 RX ADMIN — Medication 1 PATCH: at 08:49

## 2020-11-18 RX ADMIN — HYDROCHLOROTHIAZIDE 25 MG: 25 TABLET ORAL at 08:48

## 2020-11-18 RX ADMIN — CEFAZOLIN SODIUM 2000 MG: 2 SOLUTION INTRAVENOUS at 00:33

## 2020-11-18 RX ADMIN — FENOFIBRATE 145 MG: 145 TABLET ORAL at 08:48

## 2020-11-18 RX ADMIN — POTASSIUM CHLORIDE 40 MEQ: 1500 TABLET, EXTENDED RELEASE ORAL at 16:55

## 2020-11-18 RX ADMIN — Medication: at 08:50

## 2020-11-18 RX ADMIN — CEFAZOLIN SODIUM 2000 MG: 2 SOLUTION INTRAVENOUS at 08:50

## 2020-11-18 RX ADMIN — Medication 1 APPLICATION: at 16:56

## 2020-11-18 RX ADMIN — LABETALOL HYDROCHLORIDE 100 MG: 100 TABLET ORAL at 16:55

## 2020-11-18 RX ADMIN — ENOXAPARIN SODIUM 40 MG: 40 INJECTION SUBCUTANEOUS at 08:47

## 2020-11-18 RX ADMIN — LABETALOL HYDROCHLORIDE 100 MG: 100 TABLET ORAL at 21:11

## 2020-11-18 RX ADMIN — METHADONE HYDROCHLORIDE 160 MG: 10 CONCENTRATE ORAL at 08:47

## 2020-11-18 RX ADMIN — VENLAFAXINE HYDROCHLORIDE 75 MG: 37.5 CAPSULE, EXTENDED RELEASE ORAL at 08:48

## 2020-11-18 RX ADMIN — LABETALOL HYDROCHLORIDE 100 MG: 100 TABLET ORAL at 00:32

## 2020-11-18 RX ADMIN — LABETALOL HYDROCHLORIDE 100 MG: 100 TABLET ORAL at 08:48

## 2020-11-18 RX ADMIN — VENLAFAXINE HYDROCHLORIDE 150 MG: 150 CAPSULE, EXTENDED RELEASE ORAL at 08:49

## 2020-11-18 RX ADMIN — LOSARTAN POTASSIUM 100 MG: 50 TABLET, FILM COATED ORAL at 08:48

## 2020-11-18 RX ADMIN — POTASSIUM CHLORIDE 40 MEQ: 1500 TABLET, EXTENDED RELEASE ORAL at 08:52

## 2020-11-18 RX ADMIN — BUPROPION HYDROCHLORIDE 300 MG: 150 TABLET, FILM COATED, EXTENDED RELEASE ORAL at 08:47

## 2020-11-19 LAB
ANION GAP SERPL CALCULATED.3IONS-SCNC: 6 MMOL/L (ref 4–13)
BUN SERPL-MCNC: 16 MG/DL (ref 5–25)
CALCIUM SERPL-MCNC: 8.8 MG/DL (ref 8.3–10.1)
CHLORIDE SERPL-SCNC: 107 MMOL/L (ref 100–108)
CO2 SERPL-SCNC: 30 MMOL/L (ref 21–32)
CREAT SERPL-MCNC: 0.89 MG/DL (ref 0.6–1.3)
ERYTHROCYTE [DISTWIDTH] IN BLOOD BY AUTOMATED COUNT: 13.3 % (ref 11.6–15.1)
GFR SERPL CREATININE-BSD FRML MDRD: 103 ML/MIN/1.73SQ M
GLUCOSE SERPL-MCNC: 110 MG/DL (ref 65–140)
GLUCOSE SERPL-MCNC: 113 MG/DL (ref 65–140)
GLUCOSE SERPL-MCNC: 115 MG/DL (ref 65–140)
GLUCOSE SERPL-MCNC: 138 MG/DL (ref 65–140)
GLUCOSE SERPL-MCNC: 157 MG/DL (ref 65–140)
HCT VFR BLD AUTO: 40.9 % (ref 36.5–49.3)
HGB BLD-MCNC: 12.5 G/DL (ref 12–17)
MCH RBC QN AUTO: 28.9 PG (ref 26.8–34.3)
MCHC RBC AUTO-ENTMCNC: 30.6 G/DL (ref 31.4–37.4)
MCV RBC AUTO: 95 FL (ref 82–98)
MRSA NOSE QL CULT: NORMAL
PLATELET # BLD AUTO: 332 THOUSANDS/UL (ref 149–390)
PMV BLD AUTO: 10.7 FL (ref 8.9–12.7)
POTASSIUM SERPL-SCNC: 4 MMOL/L (ref 3.5–5.3)
RBC # BLD AUTO: 4.32 MILLION/UL (ref 3.88–5.62)
SODIUM SERPL-SCNC: 143 MMOL/L (ref 136–145)
WBC # BLD AUTO: 10.22 THOUSAND/UL (ref 4.31–10.16)

## 2020-11-19 PROCEDURE — 85027 COMPLETE CBC AUTOMATED: CPT | Performed by: INTERNAL MEDICINE

## 2020-11-19 PROCEDURE — 99232 SBSQ HOSP IP/OBS MODERATE 35: CPT | Performed by: INTERNAL MEDICINE

## 2020-11-19 PROCEDURE — 80048 BASIC METABOLIC PNL TOTAL CA: CPT | Performed by: INTERNAL MEDICINE

## 2020-11-19 PROCEDURE — 82948 REAGENT STRIP/BLOOD GLUCOSE: CPT

## 2020-11-19 RX ADMIN — VENLAFAXINE HYDROCHLORIDE 150 MG: 150 CAPSULE, EXTENDED RELEASE ORAL at 09:05

## 2020-11-19 RX ADMIN — VANCOMYCIN HYDROCHLORIDE 2000 MG: 5 INJECTION, POWDER, LYOPHILIZED, FOR SOLUTION INTRAVENOUS at 17:27

## 2020-11-19 RX ADMIN — VENLAFAXINE HYDROCHLORIDE 75 MG: 37.5 CAPSULE, EXTENDED RELEASE ORAL at 09:04

## 2020-11-19 RX ADMIN — ENOXAPARIN SODIUM 40 MG: 40 INJECTION SUBCUTANEOUS at 09:04

## 2020-11-19 RX ADMIN — HYDROCHLOROTHIAZIDE 25 MG: 25 TABLET ORAL at 09:05

## 2020-11-19 RX ADMIN — LABETALOL HYDROCHLORIDE 100 MG: 100 TABLET ORAL at 20:38

## 2020-11-19 RX ADMIN — FENOFIBRATE 145 MG: 145 TABLET ORAL at 09:04

## 2020-11-19 RX ADMIN — VANCOMYCIN HYDROCHLORIDE 2000 MG: 5 INJECTION, POWDER, LYOPHILIZED, FOR SOLUTION INTRAVENOUS at 03:23

## 2020-11-19 RX ADMIN — LOSARTAN POTASSIUM 100 MG: 50 TABLET, FILM COATED ORAL at 09:04

## 2020-11-19 RX ADMIN — Medication: at 09:05

## 2020-11-19 RX ADMIN — METHADONE HYDROCHLORIDE 160 MG: 10 CONCENTRATE ORAL at 10:41

## 2020-11-19 RX ADMIN — INSULIN LISPRO 2 UNITS: 100 INJECTION, SOLUTION INTRAVENOUS; SUBCUTANEOUS at 13:25

## 2020-11-19 RX ADMIN — AMLODIPINE BESYLATE: 10 TABLET ORAL at 09:05

## 2020-11-19 RX ADMIN — BUPROPION HYDROCHLORIDE 300 MG: 150 TABLET, FILM COATED, EXTENDED RELEASE ORAL at 09:04

## 2020-11-19 RX ADMIN — LABETALOL HYDROCHLORIDE 100 MG: 100 TABLET ORAL at 09:05

## 2020-11-19 RX ADMIN — Medication: at 17:30

## 2020-11-19 RX ADMIN — LABETALOL HYDROCHLORIDE 100 MG: 100 TABLET ORAL at 17:26

## 2020-11-19 RX ADMIN — Medication 1 PATCH: at 09:04

## 2020-11-20 VITALS
HEIGHT: 70 IN | WEIGHT: 315 LBS | SYSTOLIC BLOOD PRESSURE: 164 MMHG | HEART RATE: 81 BPM | TEMPERATURE: 98.5 F | RESPIRATION RATE: 18 BRPM | DIASTOLIC BLOOD PRESSURE: 72 MMHG | BODY MASS INDEX: 45.1 KG/M2 | OXYGEN SATURATION: 94 %

## 2020-11-20 LAB
ERYTHROCYTE [DISTWIDTH] IN BLOOD BY AUTOMATED COUNT: 13.4 % (ref 11.6–15.1)
GLUCOSE SERPL-MCNC: 113 MG/DL (ref 65–140)
GLUCOSE SERPL-MCNC: 118 MG/DL (ref 65–140)
HCT VFR BLD AUTO: 39.2 % (ref 36.5–49.3)
HGB BLD-MCNC: 11.8 G/DL (ref 12–17)
MCH RBC QN AUTO: 28.6 PG (ref 26.8–34.3)
MCHC RBC AUTO-ENTMCNC: 30.1 G/DL (ref 31.4–37.4)
MCV RBC AUTO: 95 FL (ref 82–98)
PLATELET # BLD AUTO: 323 THOUSANDS/UL (ref 149–390)
PMV BLD AUTO: 11.1 FL (ref 8.9–12.7)
RBC # BLD AUTO: 4.12 MILLION/UL (ref 3.88–5.62)
WBC # BLD AUTO: 9.52 THOUSAND/UL (ref 4.31–10.16)

## 2020-11-20 PROCEDURE — 85027 COMPLETE CBC AUTOMATED: CPT | Performed by: PSYCHIATRY & NEUROLOGY

## 2020-11-20 PROCEDURE — 82948 REAGENT STRIP/BLOOD GLUCOSE: CPT

## 2020-11-20 PROCEDURE — 99239 HOSP IP/OBS DSCHRG MGMT >30: CPT | Performed by: INTERNAL MEDICINE

## 2020-11-20 PROCEDURE — 99232 SBSQ HOSP IP/OBS MODERATE 35: CPT | Performed by: PODIATRIST

## 2020-11-20 PROCEDURE — 99232 SBSQ HOSP IP/OBS MODERATE 35: CPT | Performed by: INTERNAL MEDICINE

## 2020-11-20 RX ORDER — AMMONIUM LACTATE 12 G/100G
CREAM TOPICAL 2 TIMES DAILY
Qty: 385 G | Refills: 0 | Status: SHIPPED | OUTPATIENT
Start: 2020-11-20 | End: 2020-11-30 | Stop reason: SDUPTHER

## 2020-11-20 RX ORDER — DOXYCYCLINE HYCLATE 100 MG/1
100 CAPSULE ORAL EVERY 12 HOURS SCHEDULED
Status: DISCONTINUED | OUTPATIENT
Start: 2020-11-20 | End: 2020-11-20 | Stop reason: HOSPADM

## 2020-11-20 RX ORDER — POTASSIUM CHLORIDE 20 MEQ/1
40 TABLET, EXTENDED RELEASE ORAL ONCE
Status: DISCONTINUED | OUTPATIENT
Start: 2020-11-20 | End: 2020-11-20

## 2020-11-20 RX ORDER — DOXYCYCLINE HYCLATE 100 MG/1
100 CAPSULE ORAL EVERY 12 HOURS SCHEDULED
Qty: 20 CAPSULE | Refills: 0 | Status: SHIPPED | OUTPATIENT
Start: 2020-11-20 | End: 2020-11-30

## 2020-11-20 RX ADMIN — FENOFIBRATE 145 MG: 145 TABLET ORAL at 09:06

## 2020-11-20 RX ADMIN — AMLODIPINE BESYLATE: 10 TABLET ORAL at 09:06

## 2020-11-20 RX ADMIN — VENLAFAXINE HYDROCHLORIDE 150 MG: 150 CAPSULE, EXTENDED RELEASE ORAL at 09:04

## 2020-11-20 RX ADMIN — BUPROPION HYDROCHLORIDE 300 MG: 150 TABLET, FILM COATED, EXTENDED RELEASE ORAL at 09:06

## 2020-11-20 RX ADMIN — HYDROCHLOROTHIAZIDE 25 MG: 25 TABLET ORAL at 09:06

## 2020-11-20 RX ADMIN — LABETALOL HYDROCHLORIDE 100 MG: 100 TABLET ORAL at 09:06

## 2020-11-20 RX ADMIN — ENOXAPARIN SODIUM 40 MG: 40 INJECTION SUBCUTANEOUS at 09:04

## 2020-11-20 RX ADMIN — VENLAFAXINE HYDROCHLORIDE 75 MG: 37.5 CAPSULE, EXTENDED RELEASE ORAL at 09:09

## 2020-11-20 RX ADMIN — VANCOMYCIN HYDROCHLORIDE 2000 MG: 5 INJECTION, POWDER, LYOPHILIZED, FOR SOLUTION INTRAVENOUS at 05:20

## 2020-11-20 RX ADMIN — METHADONE HYDROCHLORIDE 160 MG: 10 CONCENTRATE ORAL at 09:04

## 2020-11-20 RX ADMIN — Medication: at 09:14

## 2020-11-20 RX ADMIN — DOXYCYCLINE 100 MG: 100 CAPSULE ORAL at 14:37

## 2020-11-20 RX ADMIN — Medication 1 PATCH: at 09:09

## 2020-11-20 RX ADMIN — LOSARTAN POTASSIUM 100 MG: 50 TABLET, FILM COATED ORAL at 09:06

## 2020-11-22 LAB
BACTERIA BLD CULT: NORMAL
BACTERIA BLD CULT: NORMAL

## 2020-11-30 ENCOUNTER — OFFICE VISIT (OUTPATIENT)
Dept: PODIATRY | Facility: CLINIC | Age: 45
End: 2020-11-30
Payer: COMMERCIAL

## 2020-11-30 VITALS — WEIGHT: 315 LBS | HEIGHT: 70 IN | BODY MASS INDEX: 45.1 KG/M2

## 2020-11-30 DIAGNOSIS — R23.4 FISSURE IN SKIN OF BOTH FEET: ICD-10-CM

## 2020-11-30 DIAGNOSIS — L53.9 DEPENDENT RUBOR: ICD-10-CM

## 2020-11-30 DIAGNOSIS — L03.116 LEFT LEG CELLULITIS: Primary | ICD-10-CM

## 2020-11-30 DIAGNOSIS — L84 CALLUS OF HEEL: ICD-10-CM

## 2020-11-30 PROCEDURE — 99213 OFFICE O/P EST LOW 20 MIN: CPT | Performed by: PODIATRIST

## 2020-11-30 RX ORDER — AMMONIUM LACTATE 12 G/100G
CREAM TOPICAL 2 TIMES DAILY
Qty: 385 G | Refills: 6 | Status: SHIPPED | OUTPATIENT
Start: 2020-11-30

## 2021-09-07 ENCOUNTER — TELEPHONE (OUTPATIENT)
Dept: OTHER | Facility: OTHER | Age: 46
End: 2021-09-07

## 2021-09-07 NOTE — TELEPHONE ENCOUNTER
Jessica is with a behavioral health facility, she stated that the patient was given 13 units of novalog however it has not changed  Advising Jessica that we do not cover the patients provider as they are [facility]

## 2021-11-30 ENCOUNTER — NEW PATIENT (OUTPATIENT)
Dept: URBAN - METROPOLITAN AREA CLINIC 6 | Facility: CLINIC | Age: 46
End: 2021-11-30

## 2021-11-30 DIAGNOSIS — H10.9: ICD-10-CM

## 2021-11-30 PROCEDURE — 92002 INTRM OPH EXAM NEW PATIENT: CPT

## 2021-11-30 PROCEDURE — G8427 DOCREV CUR MEDS BY ELIG CLIN: HCPCS

## 2021-11-30 ASSESSMENT — VISUAL ACUITY
OD_PH: 20/40
OS_SC: 20/100-1
OD_SC: 20/400
OS_PH: 20/40

## 2021-12-15 ENCOUNTER — PROBLEM (OUTPATIENT)
Dept: URBAN - METROPOLITAN AREA CLINIC 6 | Facility: CLINIC | Age: 46
End: 2021-12-15

## 2021-12-15 DIAGNOSIS — H10.013: ICD-10-CM

## 2021-12-15 PROCEDURE — G8427 DOCREV CUR MEDS BY ELIG CLIN: HCPCS

## 2021-12-15 PROCEDURE — 92012 INTRM OPH EXAM EST PATIENT: CPT

## 2021-12-15 ASSESSMENT — TONOMETRY
OD_IOP_MMHG: 15
OS_IOP_MMHG: 17

## 2021-12-15 ASSESSMENT — VISUAL ACUITY
OD_SC: 20/200
OS_SC: 20/200
OU_SC: J1+

## 2022-01-05 ENCOUNTER — PROBLEM (OUTPATIENT)
Dept: URBAN - METROPOLITAN AREA CLINIC 6 | Facility: CLINIC | Age: 47
End: 2022-01-05

## 2022-01-05 DIAGNOSIS — H10.013: ICD-10-CM

## 2022-01-05 PROCEDURE — 92012 INTRM OPH EXAM EST PATIENT: CPT

## 2022-01-05 ASSESSMENT — TONOMETRY
OD_IOP_MMHG: 22
OS_IOP_MMHG: 17

## 2022-01-05 ASSESSMENT — VISUAL ACUITY
OU_SC: J1+
OD_SC: 20/400
OS_SC: 20/200

## 2022-04-23 ENCOUNTER — HOSPITAL ENCOUNTER (INPATIENT)
Facility: HOSPITAL | Age: 47
LOS: 2 days | Discharge: HOME | DRG: 603 | End: 2022-04-25
Attending: EMERGENCY MEDICINE | Admitting: INTERNAL MEDICINE
Payer: COMMERCIAL

## 2022-04-23 DIAGNOSIS — L03.115 BILATERAL LOWER LEG CELLULITIS: Primary | ICD-10-CM

## 2022-04-23 DIAGNOSIS — E87.6 HYPOKALEMIA: ICD-10-CM

## 2022-04-23 DIAGNOSIS — L03.116 BILATERAL LOWER LEG CELLULITIS: Primary | ICD-10-CM

## 2022-04-23 PROBLEM — R21 RASH: Status: ACTIVE | Noted: 2022-04-23

## 2022-04-23 PROBLEM — L03.90 CELLULITIS: Status: ACTIVE | Noted: 2022-04-23

## 2022-04-23 LAB
ALBUMIN SERPL BCP-MCNC: 3.5 G/DL (ref 3.5–5)
ALP SERPL-CCNC: 99 U/L (ref 46–116)
ALT SERPL W P-5'-P-CCNC: 50 U/L (ref 12–78)
ANION GAP SERPL CALCULATED.3IONS-SCNC: 9 MMOL/L (ref 4–13)
APTT PPP: 45 SECONDS (ref 23–37)
AST SERPL W P-5'-P-CCNC: 21 U/L (ref 5–45)
BASOPHILS # BLD AUTO: 0.11 THOUSANDS/ΜL (ref 0–0.1)
BASOPHILS NFR BLD AUTO: 1 % (ref 0–1)
BILIRUB SERPL-MCNC: 0.29 MG/DL (ref 0.2–1)
BUN SERPL-MCNC: 13 MG/DL (ref 5–25)
CALCIUM SERPL-MCNC: 9.1 MG/DL (ref 8.3–10.1)
CHLORIDE SERPL-SCNC: 99 MMOL/L (ref 100–108)
CO2 SERPL-SCNC: 32 MMOL/L (ref 21–32)
CREAT SERPL-MCNC: 0.69 MG/DL (ref 0.6–1.3)
EOSINOPHIL # BLD AUTO: 0.36 THOUSAND/ΜL (ref 0–0.61)
EOSINOPHIL NFR BLD AUTO: 3 % (ref 0–6)
ERYTHROCYTE [DISTWIDTH] IN BLOOD BY AUTOMATED COUNT: 13.3 % (ref 11.6–15.1)
GFR SERPL CREATININE-BSD FRML MDRD: 113 ML/MIN/1.73SQ M
GLUCOSE SERPL-MCNC: 109 MG/DL (ref 65–140)
HCT VFR BLD AUTO: 43.8 % (ref 36.5–49.3)
HGB BLD-MCNC: 13.7 G/DL (ref 12–17)
IMM GRANULOCYTES # BLD AUTO: 0.18 THOUSAND/UL (ref 0–0.2)
IMM GRANULOCYTES NFR BLD AUTO: 1 % (ref 0–2)
INR PPP: 0.96 (ref 0.84–1.19)
LACTATE SERPL-SCNC: 1.1 MMOL/L (ref 0.5–2)
LYMPHOCYTES # BLD AUTO: 3.87 THOUSANDS/ΜL (ref 0.6–4.47)
LYMPHOCYTES NFR BLD AUTO: 28 % (ref 14–44)
MCH RBC QN AUTO: 28.4 PG (ref 26.8–34.3)
MCHC RBC AUTO-ENTMCNC: 31.3 G/DL (ref 31.4–37.4)
MCV RBC AUTO: 91 FL (ref 82–98)
MONOCYTES # BLD AUTO: 0.96 THOUSAND/ΜL (ref 0.17–1.22)
MONOCYTES NFR BLD AUTO: 7 % (ref 4–12)
NEUTROPHILS # BLD AUTO: 8.2 THOUSANDS/ΜL (ref 1.85–7.62)
NEUTS SEG NFR BLD AUTO: 60 % (ref 43–75)
NRBC BLD AUTO-RTO: 0 /100 WBCS
PLATELET # BLD AUTO: 392 THOUSANDS/UL (ref 149–390)
PMV BLD AUTO: 10.4 FL (ref 8.9–12.7)
POTASSIUM SERPL-SCNC: 3.3 MMOL/L (ref 3.5–5.3)
PROT SERPL-MCNC: 7.9 G/DL (ref 6.4–8.2)
PROTHROMBIN TIME: 12.8 SECONDS (ref 11.6–14.5)
RBC # BLD AUTO: 4.83 MILLION/UL (ref 3.88–5.62)
SODIUM SERPL-SCNC: 140 MMOL/L (ref 136–145)
WBC # BLD AUTO: 13.68 THOUSAND/UL (ref 4.31–10.16)

## 2022-04-23 PROCEDURE — 99223 1ST HOSP IP/OBS HIGH 75: CPT | Performed by: INTERNAL MEDICINE

## 2022-04-23 PROCEDURE — 96375 TX/PRO/DX INJ NEW DRUG ADDON: CPT

## 2022-04-23 PROCEDURE — 85610 PROTHROMBIN TIME: CPT | Performed by: EMERGENCY MEDICINE

## 2022-04-23 PROCEDURE — 99285 EMERGENCY DEPT VISIT HI MDM: CPT | Performed by: EMERGENCY MEDICINE

## 2022-04-23 PROCEDURE — 99284 EMERGENCY DEPT VISIT MOD MDM: CPT

## 2022-04-23 PROCEDURE — 83036 HEMOGLOBIN GLYCOSYLATED A1C: CPT | Performed by: EMERGENCY MEDICINE

## 2022-04-23 PROCEDURE — 85025 COMPLETE CBC W/AUTO DIFF WBC: CPT | Performed by: EMERGENCY MEDICINE

## 2022-04-23 PROCEDURE — 80053 COMPREHEN METABOLIC PANEL: CPT | Performed by: EMERGENCY MEDICINE

## 2022-04-23 PROCEDURE — 96365 THER/PROPH/DIAG IV INF INIT: CPT

## 2022-04-23 PROCEDURE — 83605 ASSAY OF LACTIC ACID: CPT | Performed by: EMERGENCY MEDICINE

## 2022-04-23 PROCEDURE — 85730 THROMBOPLASTIN TIME PARTIAL: CPT | Performed by: EMERGENCY MEDICINE

## 2022-04-23 PROCEDURE — 36415 COLL VENOUS BLD VENIPUNCTURE: CPT | Performed by: EMERGENCY MEDICINE

## 2022-04-23 PROCEDURE — 87040 BLOOD CULTURE FOR BACTERIA: CPT | Performed by: EMERGENCY MEDICINE

## 2022-04-23 RX ORDER — HYDROCHLOROTHIAZIDE 25 MG/1
25 TABLET ORAL DAILY
Status: DISCONTINUED | OUTPATIENT
Start: 2022-04-24 | End: 2022-04-25 | Stop reason: HOSPADM

## 2022-04-23 RX ORDER — FENOFIBRATE 145 MG/1
145 TABLET, COATED ORAL DAILY
Status: DISCONTINUED | OUTPATIENT
Start: 2022-04-24 | End: 2022-04-25 | Stop reason: HOSPADM

## 2022-04-23 RX ORDER — LABETALOL 100 MG/1
100 TABLET, FILM COATED ORAL 2 TIMES DAILY
Status: DISCONTINUED | OUTPATIENT
Start: 2022-04-23 | End: 2022-04-25 | Stop reason: HOSPADM

## 2022-04-23 RX ORDER — ACETAMINOPHEN 325 MG/1
650 TABLET ORAL EVERY 6 HOURS PRN
Status: DISCONTINUED | OUTPATIENT
Start: 2022-04-23 | End: 2022-04-25 | Stop reason: HOSPADM

## 2022-04-23 RX ORDER — CEFAZOLIN SODIUM 2 G/50ML
2000 SOLUTION INTRAVENOUS ONCE
Status: COMPLETED | OUTPATIENT
Start: 2022-04-23 | End: 2022-04-23

## 2022-04-23 RX ORDER — POTASSIUM CHLORIDE 20 MEQ/1
40 TABLET, EXTENDED RELEASE ORAL ONCE
Status: COMPLETED | OUTPATIENT
Start: 2022-04-23 | End: 2022-04-23

## 2022-04-23 RX ORDER — VENLAFAXINE HYDROCHLORIDE 150 MG/1
150 CAPSULE, EXTENDED RELEASE ORAL DAILY
Status: DISCONTINUED | OUTPATIENT
Start: 2022-04-24 | End: 2022-04-25 | Stop reason: HOSPADM

## 2022-04-23 RX ORDER — VENLAFAXINE HYDROCHLORIDE 37.5 MG/1
75 CAPSULE, EXTENDED RELEASE ORAL DAILY
Status: DISCONTINUED | OUTPATIENT
Start: 2022-04-24 | End: 2022-04-25 | Stop reason: HOSPADM

## 2022-04-23 RX ORDER — LOSARTAN POTASSIUM 100 MG/1
TABLET ORAL DAILY
Status: CANCELLED | OUTPATIENT
Start: 2022-04-24

## 2022-04-23 RX ORDER — LOSARTAN POTASSIUM 50 MG/1
100 TABLET ORAL DAILY
Status: DISCONTINUED | OUTPATIENT
Start: 2022-04-24 | End: 2022-04-25 | Stop reason: HOSPADM

## 2022-04-23 RX ORDER — SODIUM CHLORIDE 9 MG/ML
100 INJECTION, SOLUTION INTRAVENOUS CONTINUOUS
Status: DISCONTINUED | OUTPATIENT
Start: 2022-04-23 | End: 2022-04-25

## 2022-04-23 RX ORDER — KETOROLAC TROMETHAMINE 30 MG/ML
15 INJECTION, SOLUTION INTRAMUSCULAR; INTRAVENOUS ONCE
Status: COMPLETED | OUTPATIENT
Start: 2022-04-23 | End: 2022-04-23

## 2022-04-23 RX ADMIN — CEFAZOLIN SODIUM 2000 MG: 2 SOLUTION INTRAVENOUS at 17:07

## 2022-04-23 RX ADMIN — POTASSIUM CHLORIDE 40 MEQ: 1500 TABLET, EXTENDED RELEASE ORAL at 20:35

## 2022-04-23 RX ADMIN — VANCOMYCIN HYDROCHLORIDE 2000 MG: 1 INJECTION, POWDER, LYOPHILIZED, FOR SOLUTION INTRAVENOUS at 17:57

## 2022-04-23 RX ADMIN — SODIUM CHLORIDE 100 ML/HR: 0.9 INJECTION, SOLUTION INTRAVENOUS at 20:36

## 2022-04-23 RX ADMIN — LABETALOL HYDROCHLORIDE 100 MG: 100 TABLET, FILM COATED ORAL at 20:35

## 2022-04-23 RX ADMIN — KETOROLAC TROMETHAMINE 15 MG: 30 INJECTION, SOLUTION INTRAMUSCULAR at 20:35

## 2022-04-23 NOTE — ASSESSMENT & PLAN NOTE
Recent Labs     04/23/22  1633   K 3 3*     No results for input(s): MG in the last 72 hours  Plan:  · Potassium repletion:  40 max given in the ED  · Check BMP tomorrow a m

## 2022-04-23 NOTE — ED PROVIDER NOTES
History  Chief Complaint   Patient presents with    Foot Swelling     bilat  +erythema, and pain  x1week       History provided by:  Patient   used: No    42-year-old male with history of diabetes presented for evaluation of bilateral foot and lower leg erythema, pain worsening over the last 5 days  He reports a history of bilateral lower leg cellulitis in the past requiring hospital admission  Was seen by family physician earlier today and referred to the ED  States the pain is mild to moderate, burning in nature and also itchy  Reports that the erythema has been progressing proximally and is worse than last year when he was admitted for similar symptoms  He denies any systemic symptoms including fever, chills, nausea, vomiting, change in appetite, fatigue  He just recently started monitoring his blood sugar  Not currently treated for diabetes  On exam he has dry cracked skin on both feet  There is erythema, tenderness of the dorsum of both feet and lower legs  Circumferential on the right and nearly circumferential on the left  Extent of erythema marked with a marker and dated/timed  Plan labs, admission for IV antibiotics  Has had outpatient oral antibiotic treatment failure in the past under similar circumstances  Prior to Admission Medications   Prescriptions Last Dose Informant Patient Reported? Taking?    adalimumab (HUMIRA) 20 mg/0 4 mL PSKT injection   Yes Yes   Sig: Inject 20 mg under the skin once   amLODIPine-atorvastatin (CADUET) 10-10 MG per tablet   Yes Yes   Sig: Take 1 tablet by mouth daily   ammonium lactate (LAC-HYDRIN) 12 % cream   No Yes   Sig: Apply topically 2 (two) times a day   buPROPion (WELLBUTRIN) 100 mg tablet Not Taking at Unknown time  Yes No   Sig: Take 300 mg by mouth once Take once daily   Patient not taking: Reported on 4/23/2022    fenofibrate (TRICOR) 145 mg tablet   Yes Yes   Sig: Take 145 mg by mouth daily   hydrochlorothiazide (HYDRODIURIL) 25 mg tablet   Yes Yes   Sig: Take 25 mg by mouth daily   labetalol (NORMODYNE) 100 mg tablet   Yes Yes   Sig: Take 100 mg by mouth 2 (two) times a day     losartan (COZAAR) 100 MG tablet   Yes Yes   Sig: Take by mouth daily   methadone (METHADOSE) 40 MG disintegrating tablet   Yes Yes   Sig: Take 160 mg by mouth once Take once daily   venlafaxine (EFFEXOR-XR) 150 mg 24 hr capsule   Yes Yes   Sig: Take 150 mg by mouth daily   venlafaxine (EFFEXOR-XR) 75 mg 24 hr capsule   Yes Yes   Sig: Take 75 mg by mouth daily      Facility-Administered Medications: None       Past Medical History:   Diagnosis Date    Asthma        History reviewed  No pertinent surgical history  History reviewed  No pertinent family history  I have reviewed and agree with the history as documented  E-Cigarette/Vaping    E-Cigarette Use Never User      E-Cigarette/Vaping Substances    Nicotine No     THC No     CBD No     Flavoring No     Other No     Unknown No      Social History     Tobacco Use    Smoking status: Current Every Day Smoker     Packs/day: 1 00     Types: Cigarettes    Smokeless tobacco: Never Used   Vaping Use    Vaping Use: Never used   Substance Use Topics    Alcohol use: Not Currently    Drug use: Not Currently     Types: Marijuana, Heroin     Comment: last use 2 5 yrs ago On Methodone       Review of Systems   Constitutional: Negative for activity change, appetite change, chills, fatigue and fever  Respiratory: Negative for cough, chest tightness and shortness of breath  Cardiovascular: Positive for leg swelling  Gastrointestinal: Negative for abdominal pain, nausea and vomiting  Genitourinary: Negative for difficulty urinating  Musculoskeletal: Negative for back pain and neck pain  Skin: Positive for color change  Negative for wound  Neurological: Negative for dizziness, weakness, light-headedness and headaches  All other systems reviewed and are negative        Physical Exam  Physical Exam  Vitals and nursing note reviewed  Constitutional:       Appearance: Normal appearance  Cardiovascular:      Rate and Rhythm: Normal rate and regular rhythm  Pulses: Normal pulses  Musculoskeletal:      Comments: Trace to 1+ edema both lower legs  Tenderness over the areas of erythema the feet and lower legs  Skin:     General: Skin is warm and dry  Comments: Very dry heels and soles of the feet with fissures  Abrasion of left middle toe tried  Neurological:      Mental Status: He is alert  Vital Signs  ED Triage Vitals [04/23/22 1510]   Temperature Pulse Respirations Blood Pressure SpO2   97 9 °F (36 6 °C) 79 16 (!) 188/93 95 %      Temp Source Heart Rate Source Patient Position - Orthostatic VS BP Location FiO2 (%)   Oral Monitor Sitting Left arm --      Pain Score       --           Vitals:    04/23/22 1510 04/23/22 1700   BP: (!) 188/93 142/87   Pulse: 79 74   Patient Position - Orthostatic VS: Sitting          Visual Acuity      ED Medications  Medications   vancomycin (VANCOCIN) 2,000 mg in sodium chloride 0 9 % 500 mL IVPB (2,000 mg Intravenous New Bag 4/23/22 1757)   potassium chloride (K-DUR,KLOR-CON) CR tablet 40 mEq (has no administration in time range)   ketorolac (TORADOL) injection 15 mg (has no administration in time range)   ceFAZolin (ANCEF) IVPB (premix in dextrose) 2,000 mg 50 mL (0 mg Intravenous Stopped 4/23/22 1757)       Diagnostic Studies  Results Reviewed     Procedure Component Value Units Date/Time    Blood culture #2 [[id number]] Collected: 04/23/22 1659    Lab Status: In process Specimen: Blood from Arm, Left Updated: 04/23/22 1717    Lactic acid [[id number]]  (Normal) Collected: 04/23/22 1633    Lab Status: Final result Specimen: Blood from Arm, Right Updated: 04/23/22 1713     LACTIC ACID 1 1 mmol/L     Narrative:      Result may be elevated if tourniquet was used during collection      Comprehensive metabolic panel [[id number]]  (Abnormal) Collected: 04/23/22 1633    Lab Status: Final result Specimen: Blood from Arm, Right Updated: 04/23/22 1710     Sodium 140 mmol/L      Potassium 3 3 mmol/L      Chloride 99 mmol/L      CO2 32 mmol/L      ANION GAP 9 mmol/L      BUN 13 mg/dL      Creatinine 0 69 mg/dL      Glucose 109 mg/dL      Calcium 9 1 mg/dL      AST 21 U/L      ALT 50 U/L      Alkaline Phosphatase 99 U/L      Total Protein 7 9 g/dL      Albumin 3 5 g/dL      Total Bilirubin 0 29 mg/dL      eGFR 113 ml/min/1 73sq m     Narrative:      National Kidney Disease Foundation guidelines for Chronic Kidney Disease (CKD):     Stage 1 with normal or high GFR (GFR > 90 mL/min/1 73 square meters)    Stage 2 Mild CKD (GFR = 60-89 mL/min/1 73 square meters)    Stage 3A Moderate CKD (GFR = 45-59 mL/min/1 73 square meters)    Stage 3B Moderate CKD (GFR = 30-44 mL/min/1 73 square meters)    Stage 4 Severe CKD (GFR = 15-29 mL/min/1 73 square meters)    Stage 5 End Stage CKD (GFR <15 mL/min/1 73 square meters)  Note: GFR calculation is accurate only with a steady state creatinine    Protime-INR [[id number]]  (Normal) Collected: 04/23/22 1633    Lab Status: Final result Specimen: Blood from Arm, Right Updated: 04/23/22 1704     Protime 12 8 seconds      INR 0 96    APTT [[id number]]  (Abnormal) Collected: 04/23/22 1633    Lab Status: Final result Specimen: Blood from Arm, Right Updated: 04/23/22 1704     PTT 45 seconds     CBC and differential [[id number]]  (Abnormal) Collected: 04/23/22 1633    Lab Status: Final result Specimen: Blood from Arm, Right Updated: 04/23/22 1645     WBC 13 68 Thousand/uL      RBC 4 83 Million/uL      Hemoglobin 13 7 g/dL      Hematocrit 43 8 %      MCV 91 fL      MCH 28 4 pg      MCHC 31 3 g/dL      RDW 13 3 %      MPV 10 4 fL      Platelets 339 Thousands/uL      nRBC 0 /100 WBCs      Neutrophils Relative 60 %      Immat GRANS % 1 %      Lymphocytes Relative 28 %      Monocytes Relative 7 %      Eosinophils Relative 3 %      Basophils Relative 1 %      Neutrophils Absolute 8 20 Thousands/µL      Immature Grans Absolute 0 18 Thousand/uL      Lymphocytes Absolute 3 87 Thousands/µL      Monocytes Absolute 0 96 Thousand/µL      Eosinophils Absolute 0 36 Thousand/µL      Basophils Absolute 0 11 Thousands/µL     Blood culture #1 [[id number]] Collected: 04/23/22 1633    Lab Status: In process Specimen: Blood from Arm, Right Updated: 04/23/22 1642    Hemoglobin A1C [[id number]] Collected: 04/23/22 1633    Lab Status: In process Specimen: Blood from Arm, Right Updated: 04/23/22 1642                 No orders to display              Procedures  Procedures         ED Course  ED Course as of 04/23/22 1810   Sat Apr 23, 2022   1701 WBC(!): 13 68   1801 Potassium(!): 3 3                                             MDM  Number of Diagnoses or Management Options  Bilateral lower leg cellulitis  Hypokalemia  Diagnosis management comments: 42-year-old male with history of diabetes presented with worsening bilateral foot and lower leg cellulitis progressing proximally over the last 5 days  No systemic symptoms  Vitals normal   He does have leukocytosis on labs  Has needed to be admitted for outpatient treatment failure of oral antibiotics in similar circumstances in the past   Started Ancef and vancomycin admitted to medical service for further management  Mild hypokalemia which was repleted with oral potassium         Amount and/or Complexity of Data Reviewed  Clinical lab tests: ordered and reviewed  Discuss the patient with other providers: yes    Patient Progress  Patient progress: stable      Disposition  Final diagnoses:   Bilateral lower leg cellulitis   Hypokalemia     Time reflects when diagnosis was documented in both MDM as applicable and the Disposition within this note     Time User Action Codes Description Comment    4/23/2022  6:01 PM Amrit Delvalle Add [T94 331,  L83 [id number]] Bilateral lower leg cellulitis     4/23/2022  6:01 PM Snehal Willams  22  [E87 6] Hypokalemia       ED Disposition     ED Disposition Condition Date/Time Comment    Admit Stable Sat Apr 23, 2022  6:09 PM Case was discussed with Dr Rito Nguyen and the patient's admission status was agreed to be Admission Status: inpatient status to the service of Dr Rito Nguyen  Follow-up Information    None         Patient's Medications   Discharge Prescriptions    No medications on file       No discharge procedures on file      PDMP Review       Value Time User    PDMP Reviewed  Yes 11/17/2020  9:12 PM Christopher Draper MD          ED Provider  Electronically Signed by           Michael Calloway MD  04/23/22 5512

## 2022-04-23 NOTE — ASSESSMENT & PLAN NOTE
Patient is noted to have bilateral lower leg edema edema, erythematous rash, warmth for 5 day history up to the mid calf level  No systemic symptoms such as fever, chills  WBC 13 68 on admission  Of note patient was admitted for cellulitis on 11/20/2020  Patient was on IV vancomycin and discharged on doxycycline  Etiology:  Cellulitis worse on the right leg>Left  Plan:  Start patient on vancomycin IV  Elevate affected of extremities  Patient has no nausea no vomiting  Good appetite  Increased oral intake advised    Blood cultures x2 pending

## 2022-04-23 NOTE — ASSESSMENT & PLAN NOTE
Patient was previous heroin user, last use was 2 years prior  Patient is noted to take 150 mg of methadone daily  Reviewed PDMP and saw no note of this  Will probably need to call [facility] to confirm if patient is currently taking methadone

## 2022-04-23 NOTE — ASSESSMENT & PLAN NOTE
Continue to take HCTZ 20 mg once daily, losartan 100 mg once daily, amlodipine/atorvastatin 10-10 mg, labetalol 100 mg b i d

## 2022-04-23 NOTE — H&P
[facility]  H&P- Cornell Lomeli 1975, 51 y o  male MRN: [id number]  Unit/Bed#: ED 24 Encounter: [id number]  Primary Care Provider: Emily Miller MD   Date and time admitted to hospital: 4/23/2022  3:51 PM    * Cellulitis  Assessment & Plan  Patient is noted to have bilateral lower leg edema edema, erythematous rash, warmth for 5 day history up to the mid calf level  No systemic symptoms such as fever, chills  WBC 13 68 on admission  Of note patient was admitted for cellulitis on 11/20/2020  Patient was on IV vancomycin and discharged on doxycycline  Etiology:  Cellulitis worse on the right leg>Left  Plan:  Start patient on vancomycin IV  Elevate affected of extremities  Patient has no nausea no vomiting  Good appetite  Increased oral intake advised    Blood cultures x2 pending  Hypokalemia  Assessment & Plan  Recent Labs     04/23/22  1633   K 3 3*     No results for input(s): MG in the last 72 hours  Plan:  · Potassium repletion:  40 max given in the ED  · Check BMP tomorrow a m  Ankylosing spondylitis (HCC)  Assessment & Plan  Takes Humira every 14 days  Type 2 diabetes mellitus, without long-term current use of insulin (HCC)  Assessment & Plan  Lab Results   Component Value Date    HGBA1C 7 0 (H) 11/18/2021       No results for input(s): POCGLU in the last 72 hours  Blood Sugar Average: Last 72 hrs:   home meds:  Metformin 500 mg b i d  Plan:  Hold home meds  Start sliding scale  Monitor POCG 3 times a day pre meals, at bedtime  Diabetic diet    Class 3 severe obesity with body mass index (BMI) of 40 0 to 44 9 in [facility])  Assessment & Plan  Lifestyle modification    Opioid dependence  Assessment & Plan  Patient was previous heroin user, last use was 2 years prior  Patient is noted to take 150 mg of methadone daily    Reviewed PDMP and saw no note of this  Will probably need to call [facility] to confirm if patient is currently taking methadone  Depression  Assessment & Plan  Continue venlafaxine    Dyslipidemia  Assessment & Plan  Continue to take after Avastin, fenofibrate    Essential hypertension  Assessment & Plan  Continue to take HCTZ 20 mg once daily, losartan 100 mg once daily, amlodipine/atorvastatin 10-10 mg, labetalol 100 mg b i d     VTE Pharmacologic Prophylaxis:   Moderate Risk (Score 3-4) - Pharmacological DVT Prophylaxis Ordered: enoxaparin (Lovenox)  Code Status: Level 1 - Full Code   Discussion with family: Patient declined call to   Anticipated Length of Stay: Patient will be admitted on an inpatient basis with an anticipated length of stay of greater than 2 midnights secondary to Cellulitis  Chief Complaint:  Lower extremity rash    History of Present Illness:  Cammie Alcazar is a 51 y o  male with a PMH of type 2 diabetes, hypertension, hyperlipidemia, depression, alkalosing spondylitis who presents with lower leg rash  Patient noted lower leg rash started in the right lower foot 5 days prior  Progressed to the level of the mid calf area  Rash is erythematous, with increased swelling, increased pain  With associated pruritus  No fever, no chills  Patient endorses good appetite and has no nausea vomiting  No recent injuries noted  Patient consulted PCP today which saw the rash and advised him to go in for ED consult  In the emergency department patient had a WBC of 30 68  Blood cultures taken  Lactic acid was 1 1  Patient did not meet sepsis criteria  Patient will be admitted for cellulitis  Review of Systems:  Review of Systems   Constitutional: Negative for chills and fever  HENT: Negative for ear pain and sore throat  Eyes: Negative for pain and visual disturbance  Respiratory: Negative for cough and shortness of breath  Cardiovascular: Negative for chest pain and palpitations  Gastrointestinal: Negative for abdominal pain and vomiting     Genitourinary: Negative for dysuria and hematuria  Musculoskeletal: Negative for arthralgias and back pain  Skin: Positive for rash  Negative for color change  Neurological: Negative for seizures and syncope  All other systems reviewed and are negative  Past Medical and Surgical History:   Past Medical History:   Diagnosis Date    Asthma        History reviewed  No pertinent surgical history  Meds/Allergies:  Prior to Admission medications    Medication Sig Start Date End Date Taking? Authorizing Provider   adalimumab (HUMIRA) 20 mg/0 4 mL PSKT injection Inject 20 mg under the skin once   Yes Historical Provider, MD   amLODIPine-atorvastatin (CADUET) 10-10 MG per tablet Take 1 tablet by mouth daily   Yes Historical Provider, MD   ammonium lactate (LAC-HYDRIN) 12 % cream Apply topically 2 (two) times a day 11/30/20  Yes Arvind Apple DPM   fenofibrate (TRICOR) 145 mg tablet Take 145 mg by mouth daily   Yes Historical Provider, MD   hydrochlorothiazide (HYDRODIURIL) 25 mg tablet Take 25 mg by mouth daily   Yes Historical Provider, MD   labetalol (NORMODYNE) 100 mg tablet Take 100 mg by mouth 2 (two) times a day     Yes Historical Provider, MD   losartan (COZAAR) 100 MG tablet Take by mouth daily   Yes Historical Provider, MD   methadone (METHADOSE) 40 MG disintegrating tablet Take 160 mg by mouth once Take once daily   Yes Historical Provider, MD   venlafaxine (EFFEXOR-XR) 150 mg 24 hr capsule Take 150 mg by mouth daily   Yes Historical Provider, MD   venlafaxine (EFFEXOR-XR) 75 mg 24 hr capsule Take 75 mg by mouth daily   Yes Historical Provider, MD   buPROPion (WELLBUTRIN) 100 mg tablet Take 300 mg by mouth once Take once daily  Patient not taking: Reported on 4/23/2022 4/23/22  Historical Provider, MD     I have reviewed home medications with patient personally  Allergies:    Allergies   Allergen Reactions    Penicillins        Social History:  Marital Status: Single   Occupation:  None  Patient Pre-hospital Living Situation: Home  Patient Pre-hospital Level of Mobility: walks  Patient Pre-hospital Diet Restrictions:  None  Substance Use History:   Social History     Substance and Sexual Activity   Alcohol Use Not Currently     Social History     Tobacco Use   Smoking Status Current Every Day Smoker    Packs/day: 1 00    Types: Cigarettes   Smokeless Tobacco Never Used     Social History     Substance and Sexual Activity   Drug Use Not Currently    Types: Marijuana, Heroin    Comment: last use 2 5 yrs ago On Methodone       Family History:  History reviewed  No pertinent family history  Physical Exam:     Vitals:   Blood Pressure: 142/87 (04/23/22 1700)  Pulse: 74 (04/23/22 1700)  Temperature: 97 9 °F (36 6 °C) (04/23/22 1510)  Temp Source: Oral (04/23/22 1510)  Respirations: 16 (04/23/22 1700)  Height: 5' 11" (180 3 cm) (04/23/22 1510)  Weight - Scale: (!) 150 kg (330 lb) (04/23/22 1510)  SpO2: 95 % (04/23/22 1700)    Physical Exam  Vitals and nursing note reviewed  Constitutional:       Appearance: He is well-developed  He is obese  HENT:      Head: Normocephalic and atraumatic  Nose: Nose normal       Mouth/Throat:      Mouth: Mucous membranes are moist    Eyes:      Conjunctiva/sclera: Conjunctivae normal    Cardiovascular:      Rate and Rhythm: Normal rate and regular rhythm  Heart sounds: Normal heart sounds  No murmur heard  Pulmonary:      Effort: Pulmonary effort is normal  No respiratory distress  Breath sounds: Normal breath sounds  Abdominal:      General: Abdomen is flat  Palpations: Abdomen is soft  Tenderness: There is no abdominal tenderness  Musculoskeletal:         General: No swelling  Cervical back: Neck supple  Right lower leg: Edema present  Left lower leg: No edema  Skin:     General: Skin is warm and dry  Capillary Refill: Capillary refill takes less than 2 seconds        Findings: Rash (Erythematous, warm, tender rash on bilateral lower extremities  Right greater than left) present  Neurological:      General: No focal deficit present  Mental Status: He is alert and oriented to person, place, and time  Mental status is at baseline  Psychiatric:         Mood and Affect: Mood normal          Additional Data:     Lab Results:  Results from last 7 days   Lab Units 04/23/22  1633   WBC Thousand/uL 13 68*   HEMOGLOBIN g/dL 13 7   HEMATOCRIT % 43 8   PLATELETS Thousands/uL 392*   NEUTROS PCT % 60   LYMPHS PCT % 28   MONOS PCT % 7   EOS PCT % 3     Results from last 7 days   Lab Units 04/23/22  1633   SODIUM mmol/L 140   POTASSIUM mmol/L 3 3*   CHLORIDE mmol/L 99*   CO2 mmol/L 32   BUN mg/dL 13   CREATININE mg/dL 0 69   ANION GAP mmol/L 9   CALCIUM mg/dL 9 1   ALBUMIN g/dL 3 5   TOTAL BILIRUBIN mg/dL 0 29   ALK PHOS U/L 99   ALT U/L 50   AST U/L 21   GLUCOSE RANDOM mg/dL 109     Results from last 7 days   Lab Units 04/23/22  1633   INR  0 96             Results from last 7 days   Lab Units 04/23/22  1633   LACTIC ACID mmol/L 1 1       Imaging: No pertinent imaging reviewed  No orders to display       EKG and Other Studies Reviewed on Admission:   · No EKGs done    ** Please Note: This note has been constructed using a voice recognition system   **

## 2022-04-23 NOTE — ASSESSMENT & PLAN NOTE
Lab Results   Component Value Date    HGBA1C 7 0 (H) 11/18/2021       No results for input(s): POCGLU in the last 72 hours  Blood Sugar Average: Last 72 hrs:   home meds:  Metformin 500 mg b i d    Plan:  Hold home meds  Start sliding scale  Monitor POCG 3 times a day pre meals, at bedtime  Diabetic diet

## 2022-04-24 LAB
ANION GAP SERPL CALCULATED.3IONS-SCNC: 8 MMOL/L (ref 4–13)
BUN SERPL-MCNC: 11 MG/DL (ref 5–25)
CALCIUM SERPL-MCNC: 9 MG/DL (ref 8.3–10.1)
CHLORIDE SERPL-SCNC: 104 MMOL/L (ref 100–108)
CO2 SERPL-SCNC: 30 MMOL/L (ref 21–32)
CREAT SERPL-MCNC: 0.67 MG/DL (ref 0.6–1.3)
ERYTHROCYTE [DISTWIDTH] IN BLOOD BY AUTOMATED COUNT: 13.2 % (ref 11.6–15.1)
EST. AVERAGE GLUCOSE BLD GHB EST-MCNC: 163 MG/DL
GFR SERPL CREATININE-BSD FRML MDRD: 114 ML/MIN/1.73SQ M
GLUCOSE SERPL-MCNC: 120 MG/DL (ref 65–140)
GLUCOSE SERPL-MCNC: 125 MG/DL (ref 65–140)
GLUCOSE SERPL-MCNC: 138 MG/DL (ref 65–140)
GLUCOSE SERPL-MCNC: 146 MG/DL (ref 65–140)
GLUCOSE SERPL-MCNC: 187 MG/DL (ref 65–140)
HBA1C MFR BLD: 7.3 %
HCT VFR BLD AUTO: 43.7 % (ref 36.5–49.3)
HGB BLD-MCNC: 13.7 G/DL (ref 12–17)
MCH RBC QN AUTO: 28.5 PG (ref 26.8–34.3)
MCHC RBC AUTO-ENTMCNC: 31.4 G/DL (ref 31.4–37.4)
MCV RBC AUTO: 91 FL (ref 82–98)
PLATELET # BLD AUTO: 392 THOUSANDS/UL (ref 149–390)
PMV BLD AUTO: 10.7 FL (ref 8.9–12.7)
POTASSIUM SERPL-SCNC: 3.5 MMOL/L (ref 3.5–5.3)
RBC # BLD AUTO: 4.8 MILLION/UL (ref 3.88–5.62)
SODIUM SERPL-SCNC: 142 MMOL/L (ref 136–145)
VANCOMYCIN TROUGH SERPL-MCNC: 10.4 UG/ML (ref 10–20)
WBC # BLD AUTO: 12.27 THOUSAND/UL (ref 4.31–10.16)

## 2022-04-24 PROCEDURE — 80048 BASIC METABOLIC PNL TOTAL CA: CPT

## 2022-04-24 PROCEDURE — 85027 COMPLETE CBC AUTOMATED: CPT

## 2022-04-24 PROCEDURE — 99232 SBSQ HOSP IP/OBS MODERATE 35: CPT | Performed by: INTERNAL MEDICINE

## 2022-04-24 PROCEDURE — 82948 REAGENT STRIP/BLOOD GLUCOSE: CPT

## 2022-04-24 PROCEDURE — 80202 ASSAY OF VANCOMYCIN: CPT | Performed by: INTERNAL MEDICINE

## 2022-04-24 RX ORDER — METHADONE HYDROCHLORIDE 10 MG/1
160 TABLET ORAL ONCE
Status: COMPLETED | OUTPATIENT
Start: 2022-04-24 | End: 2022-04-24

## 2022-04-24 RX ADMIN — HYDROCHLOROTHIAZIDE 25 MG: 25 TABLET ORAL at 08:25

## 2022-04-24 RX ADMIN — LABETALOL HYDROCHLORIDE 100 MG: 100 TABLET, FILM COATED ORAL at 17:40

## 2022-04-24 RX ADMIN — VANCOMYCIN HYDROCHLORIDE 1500 MG: 5 INJECTION, POWDER, LYOPHILIZED, FOR SOLUTION INTRAVENOUS at 16:18

## 2022-04-24 RX ADMIN — LABETALOL HYDROCHLORIDE 100 MG: 100 TABLET, FILM COATED ORAL at 08:25

## 2022-04-24 RX ADMIN — LOSARTAN POTASSIUM 100 MG: 50 TABLET, FILM COATED ORAL at 08:25

## 2022-04-24 RX ADMIN — ENOXAPARIN SODIUM 40 MG: 40 INJECTION SUBCUTANEOUS at 08:25

## 2022-04-24 RX ADMIN — VENLAFAXINE HYDROCHLORIDE 75 MG: 37.5 CAPSULE, EXTENDED RELEASE ORAL at 08:25

## 2022-04-24 RX ADMIN — VANCOMYCIN HYDROCHLORIDE 1500 MG: 5 INJECTION, POWDER, LYOPHILIZED, FOR SOLUTION INTRAVENOUS at 23:12

## 2022-04-24 RX ADMIN — VANCOMYCIN HYDROCHLORIDE 1500 MG: 5 INJECTION, POWDER, LYOPHILIZED, FOR SOLUTION INTRAVENOUS at 02:31

## 2022-04-24 RX ADMIN — VENLAFAXINE HYDROCHLORIDE 150 MG: 150 CAPSULE, EXTENDED RELEASE ORAL at 08:25

## 2022-04-24 RX ADMIN — AMLODIPINE BESYLATE: 10 TABLET ORAL at 08:25

## 2022-04-24 RX ADMIN — METHADONE HYDROCHLORIDE 160 MG: 10 TABLET ORAL at 11:37

## 2022-04-24 RX ADMIN — FENOFIBRATE 145 MG: 145 TABLET, FILM COATED ORAL at 08:25

## 2022-04-24 NOTE — PLAN OF CARE
Problem: PAIN - ADULT  Goal: Verbalizes/displays adequate comfort level or baseline comfort level  Description: Interventions:  - Encourage patient to monitor pain and request assistance  - Assess pain using appropriate pain scale  - Administer analgesics based on type and severity of pain and evaluate response  - Implement non-pharmacological measures as appropriate and evaluate response  - Consider cultural and social influences on pain and pain management  - Notify physician/advanced practitioner if interventions unsuccessful or patient reports new pain  Outcome: Progressing     Problem: INFECTION - ADULT  Goal: Absence or prevention of progression during hospitalization  Description: INTERVENTIONS:  - Assess and monitor for signs and symptoms of infection  - Monitor lab/diagnostic results  - Monitor all insertion sites, i e  indwelling lines, tubes, and drains  - Monitor endotracheal if appropriate and nasal secretions for changes in amount and color  - [place] appropriate cooling/warming therapies per order  - Administer medications as ordered  - Instruct and encourage patient and family to use good hand hygiene technique  - Identify and instruct in appropriate isolation precautions for identified infection/condition  Outcome: Progressing  Goal: Absence of fever/infection during neutropenic period  Description: INTERVENTIONS:  - Monitor WBC    Outcome: Progressing     Problem: DISCHARGE PLANNING  Goal: Discharge to home or other facility with appropriate resources  Description: INTERVENTIONS:  - Identify barriers to discharge w/patient and caregiver  - Arrange for needed discharge resources and transportation as appropriate  - Identify discharge learning needs (meds, wound care, etc )  - Arrange for interpretive services to assist at discharge as needed  - Refer to Case Management Department for coordinating discharge planning if the patient needs post-hospital services based on physician/advanced practitioner order or complex needs related to functional status, cognitive ability, or social support system  Outcome: Progressing     Problem: Knowledge Deficit  Goal: Patient/family/caregiver demonstrates understanding of disease process, treatment plan, medications, and discharge instructions  Description: Complete learning assessment and assess knowledge base    Interventions:  - Provide teaching at level of understanding  - Provide teaching via preferred learning methods  Outcome: Progressing

## 2022-04-24 NOTE — PROGRESS NOTES
Vancomycin Assessment    Gabriella Aquino is a 51 y o  male who is currently receiving vancomycin 1500 mg every 8 hours for skin-soft tissue infection     Relevant clinical data and objective history reviewed:  Creatinine   Date Value Ref Range Status   04/23/2022 0 69 0 60 - 1 30 mg/dL Final     Comment:     Standardized to IDMS reference method   11/19/2020 0 89 0 60 - 1 30 mg/dL Final     Comment:     Standardized to IDMS reference method   11/18/2020 0 88 0 60 - 1 30 mg/dL Final     Comment:     Standardized to IDMS reference method     /72   Pulse 66   Temp 98 °F (36 7 °C)   Resp 20   Ht 5' 11" (1 803 m)   Wt (!) 150 kg (330 lb)   SpO2 93%   BMI 46 03 kg/m²   I/O last 3 completed shifts: In: 48 [IV Piggyback:50]  Out: -   Lab Results   Component Value Date/Time    BUN 13 04/23/2022 04:33 PM    WBC 13 68 (H) 04/23/2022 04:33 PM    HGB 13 7 04/23/2022 04:33 PM    HCT 43 8 04/23/2022 04:33 PM    MCV 91 04/23/2022 04:33 PM     (H) 04/23/2022 04:33 PM     Temp Readings from Last 3 Encounters:   04/23/22 98 °F (36 7 °C)   11/20/20 98 5 °F (36 9 °C) (Oral)   11/06/20 98 °F (36 7 °C)     Vancomycin Days of Therapy: 1    Assessment/Plan  The patient is currently on vancomycin utilizing scheduled dosing based on adjusted body weight (due to obesity)  The patient is currently receiving 1500 mg every 8 hours and is clinically appropriate and dose will be continued  Pharmacy will also follow closely for s/sx of nephrotoxicity, infusion reactions, and appropriateness of therapy  BMP and CBC will be ordered per protocol  Plan for trough as patient approaches steady state, prior to the 4th  dose at approximately 1730 on 4/24  Due to infection severity, will target a trough of 15-20 (appropriate for most indications)   Pharmacy will continue to follow the patients culture results and clinical progress daily      Sukhjinder Gloria, Pharmacist

## 2022-04-24 NOTE — ASSESSMENT & PLAN NOTE
Plan:  Continue to take HCTZ 20 mg once daily, losartan 100 mg once daily, amlodipine/atorvastatin 10-10 mg, labetalol 100 mg b i d

## 2022-04-24 NOTE — PROGRESS NOTES
[facility]  Progress Note Dick Oar 1975, 51 y o  male MRN: [id number]  Unit/Bed#: W -01 Encounter: [id number]  Primary Care Provider: Madhavi Mariee MD   Date and time admitted to hospital: 4/23/2022  3:51 PM    * Cellulitis  Assessment & Plan  Patient is noted to have bilateral lower leg edema edema, erythematous rash, warmth for 5 day history up to the mid calf level  No systemic symptoms such as fever, chills  WBC 13 68 on admission  Of note patient was admitted for cellulitis on 11/20/2020  Patient was on IV vancomycin and discharged on doxycycline  Etiology:  Cellulitis worse on the right leg>Left  On exam, erythema receding from demarcated line  Area nontender  Plan:  Continue vancomycin IV  Elevate affected of extremities  IVF   Blood cultures x2 pending    Hypokalemia  Assessment & Plan  Recent Labs     04/23/22  1633 04/24/22  0503   K 3 3* 3 5     No results for input(s): MG in the last 72 hours  Potassium repletion:  40 max given in the ED  Improved  Plan:  · Check BMP tomorrow a m   · A m  magnesium      Ankylosing spondylitis (HCC)  Assessment & Plan  Takes Humira every 14 days  Type 2 diabetes mellitus, without long-term current use of insulin [facility])  Assessment & Plan  Lab Results   Component Value Date    HGBA1C 7 3 (H) 04/23/2022       Recent Labs     04/24/22  0732 04/24/22  1124   POCGLU 125 138       Blood Sugar Average: Last 72 hrs:  (P) 131 5 home meds:  Metformin 500 mg b i d  Plan:    Hold home meds  Insulin sliding scale  Monitor POCG 3 times a day pre meals, at bedtime  Diabetic diet    Class 3 severe obesity with body mass index (BMI) of 40 0 to 44 9 in adult [facility])  Assessment & Plan  Plan:  Lifestyle modification    Opioid dependence  Assessment & Plan  Patient was previous heroin user, last use was 2 years prior  Patient is noted to take 160 mg of methadone daily  Reviewed PDMP and saw no note of this       [facility] - 2022 Attempted 3 calls to clinic during business hours, without response  Left voice message  Patient reports taking 150 mg daily  Contacted our pharmacy, and we are in agreement of providing a one time dose of methadone, and our team will reach out tomorrow to [facility] treatment services clinic to confirm methadone dosage and frequency  Phone number is [phone]    Plan:  160 mg x 1 methadone  Tomorrow, team will a attempt call to clinic    Depression  Assessment & Plan  Plan:  Continue venlafaxine    Dyslipidemia  Assessment & Plan  Plan:  Continue to take after Avastin, fenofibrate    Essential hypertension  Assessment & Plan  Plan:  Continue to take HCTZ 20 mg once daily, losartan 100 mg once daily, amlodipine/atorvastatin 10-10 mg, labetalol 100 mg b i d       VTE Pharmacologic Prophylaxis: VTE Score: 4 Moderate Risk (Score 3-4) - Pharmacological DVT Prophylaxis Ordered: enoxaparin (Lovenox)  Patient Centered Rounds: I performed bedside rounds with nursing staff today  Discussions with Specialists or Other Care Team Provider:  Pharmacy    Education and Discussions with Family / Patient: Patient declined call to   Current Length of Stay: 1 day(s)  Current Patient Status: Inpatient   Discharge Plan: Anticipate discharge in 24-48 hrs to home  Code Status: Level 1 - Full Code    Subjective:   Nursing reported no overnight events  Patient denied all symptoms including chills, fevers, diaphoresis, bilateral lower extremity pain, warmth  Patient adds that he noticed improvement on his bilateral lower extremities  Objective:     Vitals:   Temp (24hrs), Av 9 °F (36 6 °C), Min:97 9 °F (36 6 °C), Max:98 °F (36 7 °C)    Temp:  [97 9 °F (36 6 °C)-98 °F (36 7 °C)] 97 9 °F (36 6 °C)  HR:  [66-79] 76  Resp:  [16-20] 18  BP: (124-188)/(72-93) 146/84  SpO2:  [92 %-95 %] 92 %  Body mass index is 46 03 kg/m²       Input and Output Summary (last 24 hours): Intake/Output Summary (Last 24 hours) at 4/24/2022 1134  Last data filed at 4/24/2022 0201  Gross per 24 hour   Intake 1091 67 ml   Output --   Net 1091 67 ml       Physical Exam:   Physical Exam  Vitals and nursing note reviewed  Constitutional:       General: He is not in acute distress  Appearance: Normal appearance  He is obese  He is not ill-appearing  HENT:      Head: Normocephalic and atraumatic  Mouth/Throat:      Mouth: Mucous membranes are dry  Pharynx: Oropharynx is clear  Eyes:      General: No scleral icterus  Conjunctiva/sclera: Conjunctivae normal    Cardiovascular:      Rate and Rhythm: Normal rate and regular rhythm  Pulses: Normal pulses  Heart sounds: No murmur heard  No gallop  Pulmonary:      Effort: Pulmonary effort is normal  No respiratory distress  Breath sounds: Normal breath sounds  No wheezing or rales  Abdominal:      General: Bowel sounds are normal  There is no distension  Palpations: Abdomen is soft  There is no mass  Tenderness: There is no abdominal tenderness  Musculoskeletal:         General: No tenderness  Normal range of motion  Cervical back: Normal range of motion and neck supple  No tenderness  Right lower leg: No edema  Left lower leg: No edema  Skin:     General: Skin is warm and dry  Capillary Refill: Capillary refill takes less than 2 seconds  Findings: Erythema (Receding from demarcated line, nontender) present  Neurological:      General: No focal deficit present  Mental Status: He is alert and oriented to person, place, and time  Mental status is at baseline  Sensory: No sensory deficit  Psychiatric:         Mood and Affect: Mood normal          Behavior: Behavior normal          Thought Content:  Thought content normal          Judgment: Judgment normal           Additional Data:     Labs:  Results from last 7 days   Lab Units 04/24/22  0503 04/23/22  1633 04/23/22  1633   WBC Thousand/uL 12 27*   < > 13 68*   HEMOGLOBIN g/dL 13 7   < > 13 7   HEMATOCRIT % 43 7   < > 43 8   PLATELETS Thousands/uL 392*   < > 392*   NEUTROS PCT %  --   --  60   LYMPHS PCT %  --   --  28   MONOS PCT %  --   --  7   EOS PCT %  --   --  3    < > = values in this interval not displayed  Results from last 7 days   Lab Units 04/24/22  0503 04/23/22  1633 04/23/22  1633   SODIUM mmol/L 142   < > 140   POTASSIUM mmol/L 3 5   < > 3 3*   CHLORIDE mmol/L 104   < > 99*   CO2 mmol/L 30   < > 32   BUN mg/dL 11   < > 13   CREATININE mg/dL 0 67   < > 0 69   ANION GAP mmol/L 8   < > 9   CALCIUM mg/dL 9 0   < > 9 1   ALBUMIN g/dL  --   --  3 5   TOTAL BILIRUBIN mg/dL  --   --  0 29   ALK PHOS U/L  --   --  99   ALT U/L  --   --  50   AST U/L  --   --  21   GLUCOSE RANDOM mg/dL 120   < > 109    < > = values in this interval not displayed  Results from last 7 days   Lab Units 04/23/22  1633   INR  0 96     Results from last 7 days   Lab Units 04/24/22  1124 04/24/22  0732   POC GLUCOSE mg/dl 138 125     Results from last 7 days   Lab Units 04/23/22  1633   HEMOGLOBIN A1C % 7 3*     Results from last 7 days   Lab Units 04/23/22  1633   LACTIC ACID mmol/L 1 1       Lines/Drains:  Invasive Devices  Report    Peripheral Intravenous Line            Peripheral IV 04/23/22 Right Antecubital <1 day                      Imaging: No pertinent imaging reviewed  Recent Cultures (last 7 days):   Results from last 7 days   Lab Units 04/23/22  1659 04/23/22  1633   BLOOD CULTURE  Received in Microbiology Lab  Culture in Progress  Received in Microbiology Lab  Culture in Progress         Last 24 Hours Medication List:   Current Facility-Administered Medications   Medication Dose Route Frequency Provider Last Rate    acetaminophen  650 mg Oral Q6H PRN Tatiana Hernandez MD      amLODIPine-atorvastatin (CADUET 10/10) combo dose   Oral Daily Tatiana Hernandez MD      enoxaparin  40 mg Subcutaneous Daily Ann-Marie Lu MD      fenofibrate  145 mg Oral Daily Ann-Marie Lu MD      hydrochlorothiazide  25 mg Oral Daily Ann-Marie Lu MD      insulin lispro  2-12 Units Subcutaneous TID [facility] Ann-Marie Lu MD      labetalol  100 mg Oral BID Ann-Marie Lu MD      losartan  100 mg Oral Daily Ann-Marie Lu MD      methadone  160 mg Oral Once Saravanan Oshea MD      sodium chloride  100 mL/hr Intravenous Continuous Ann-Marie Lu  mL/hr (04/23/22 2036)    vancomycin  15 mg/kg (Adjusted) Intravenous Q8H Ann-Marie Lu MD      venlafaxine  150 mg Oral Daily Ann-Marie Lu MD      venlafaxine  75 mg Oral Daily Ann-Marie Lu MD          Today, Patient Was Seen By: Saravanan Oshea MD    **Please Note: This note may have been constructed using a voice recognition system  **

## 2022-04-24 NOTE — ASSESSMENT & PLAN NOTE
Patient is noted to have bilateral lower leg edema edema, erythematous rash, warmth for 5 day history up to the mid calf level  No systemic symptoms such as fever, chills  WBC 13 68 on admission  Of note patient was admitted for cellulitis on 11/20/2020  Patient was on IV vancomycin and discharged on doxycycline  Etiology:  Cellulitis worse on the right leg>Left  On exam, erythema receding from demarcated line  Area nontender      Plan:  Continue vancomycin IV  Elevate affected of extremities  IVF   Blood cultures x2 pending

## 2022-04-24 NOTE — ASSESSMENT & PLAN NOTE
Lab Results   Component Value Date    HGBA1C 7 3 (H) 04/23/2022       Recent Labs     04/24/22  0732 04/24/22  1124   POCGLU 125 138       Blood Sugar Average: Last 72 hrs:  (P) 131 5 home meds:  Metformin 500 mg b i d      Plan:    Hold home meds  Insulin sliding scale  Monitor POCG 3 times a day pre meals, at bedtime  Diabetic diet

## 2022-04-24 NOTE — ASSESSMENT & PLAN NOTE
Recent Labs     04/23/22  1633 04/24/22  0503   K 3 3* 3 5     No results for input(s): MG in the last 72 hours  Potassium repletion:  40 max given in the ED  Improved        Plan:  · Check BMP tomorrow a m   · A m  magnesium

## 2022-04-24 NOTE — ASSESSMENT & PLAN NOTE
Patient was previous heroin user, last use was 2 years prior  Patient is noted to take 160 mg of methadone daily  Reviewed PDMP and saw no note of this  [facility] - 04/24/2022 Attempted 3 calls to clinic during business hours, without response  Left voice message  Patient reports taking 150 mg daily  Contacted our pharmacy, and we are in agreement of providing a one time dose of methadone, and our team will reach out tomorrow to [facility] clinic to confirm methadone dosage and frequency    Phone number is [phone]    Plan:  160 mg x 1 methadone  Tomorrow, team will a attempt call to clinic

## 2022-04-25 VITALS
BODY MASS INDEX: 44.1 KG/M2 | SYSTOLIC BLOOD PRESSURE: 152 MMHG | OXYGEN SATURATION: 92 % | WEIGHT: 315 LBS | TEMPERATURE: 98.4 F | HEIGHT: 71 IN | RESPIRATION RATE: 17 BRPM | DIASTOLIC BLOOD PRESSURE: 76 MMHG | HEART RATE: 64 BPM

## 2022-04-25 LAB
ANION GAP SERPL CALCULATED.3IONS-SCNC: 7 MMOL/L (ref 4–13)
BUN SERPL-MCNC: 10 MG/DL (ref 5–25)
CALCIUM SERPL-MCNC: 8.2 MG/DL (ref 8.3–10.1)
CHLORIDE SERPL-SCNC: 103 MMOL/L (ref 100–108)
CO2 SERPL-SCNC: 30 MMOL/L (ref 21–32)
CREAT SERPL-MCNC: 0.7 MG/DL (ref 0.6–1.3)
ERYTHROCYTE [DISTWIDTH] IN BLOOD BY AUTOMATED COUNT: 13.4 % (ref 11.6–15.1)
GFR SERPL CREATININE-BSD FRML MDRD: 112 ML/MIN/1.73SQ M
GLUCOSE SERPL-MCNC: 159 MG/DL (ref 65–140)
GLUCOSE SERPL-MCNC: 219 MG/DL (ref 65–140)
GLUCOSE SERPL-MCNC: 270 MG/DL (ref 65–140)
HCT VFR BLD AUTO: 41.9 % (ref 36.5–49.3)
HGB BLD-MCNC: 13.1 G/DL (ref 12–17)
MAGNESIUM SERPL-MCNC: 2.2 MG/DL (ref 1.6–2.6)
MCH RBC QN AUTO: 28.7 PG (ref 26.8–34.3)
MCHC RBC AUTO-ENTMCNC: 31.3 G/DL (ref 31.4–37.4)
MCV RBC AUTO: 92 FL (ref 82–98)
PLATELET # BLD AUTO: 375 THOUSANDS/UL (ref 149–390)
PMV BLD AUTO: 10.9 FL (ref 8.9–12.7)
POTASSIUM SERPL-SCNC: 3.2 MMOL/L (ref 3.5–5.3)
RBC # BLD AUTO: 4.57 MILLION/UL (ref 3.88–5.62)
SODIUM SERPL-SCNC: 140 MMOL/L (ref 136–145)
WBC # BLD AUTO: 10.55 THOUSAND/UL (ref 4.31–10.16)

## 2022-04-25 PROCEDURE — 80048 BASIC METABOLIC PNL TOTAL CA: CPT

## 2022-04-25 PROCEDURE — 99239 HOSP IP/OBS DSCHRG MGMT >30: CPT | Performed by: INTERNAL MEDICINE

## 2022-04-25 PROCEDURE — 85027 COMPLETE CBC AUTOMATED: CPT

## 2022-04-25 PROCEDURE — 82948 REAGENT STRIP/BLOOD GLUCOSE: CPT

## 2022-04-25 PROCEDURE — 83735 ASSAY OF MAGNESIUM: CPT

## 2022-04-25 RX ORDER — POTASSIUM CHLORIDE 20 MEQ/1
40 TABLET, EXTENDED RELEASE ORAL ONCE
Status: COMPLETED | OUTPATIENT
Start: 2022-04-25 | End: 2022-04-25

## 2022-04-25 RX ORDER — DOXYCYCLINE 100 MG/1
100 TABLET ORAL 2 TIMES DAILY
Qty: 16 TABLET | Refills: 0 | Status: SHIPPED | OUTPATIENT
Start: 2022-04-25 | End: 2022-05-03

## 2022-04-25 RX ADMIN — FENOFIBRATE 145 MG: 145 TABLET, FILM COATED ORAL at 09:03

## 2022-04-25 RX ADMIN — ENOXAPARIN SODIUM 40 MG: 40 INJECTION SUBCUTANEOUS at 09:02

## 2022-04-25 RX ADMIN — INSULIN LISPRO 4 UNITS: 100 INJECTION, SOLUTION INTRAVENOUS; SUBCUTANEOUS at 09:06

## 2022-04-25 RX ADMIN — POTASSIUM CHLORIDE 40 MEQ: 1500 TABLET, EXTENDED RELEASE ORAL at 09:03

## 2022-04-25 RX ADMIN — HYDROCHLOROTHIAZIDE 25 MG: 25 TABLET ORAL at 09:03

## 2022-04-25 RX ADMIN — LABETALOL HYDROCHLORIDE 100 MG: 100 TABLET, FILM COATED ORAL at 09:03

## 2022-04-25 RX ADMIN — AMLODIPINE BESYLATE: 10 TABLET ORAL at 09:03

## 2022-04-25 RX ADMIN — POTASSIUM CHLORIDE 40 MEQ: 1500 TABLET, EXTENDED RELEASE ORAL at 13:10

## 2022-04-25 RX ADMIN — VANCOMYCIN HYDROCHLORIDE 2000 MG: 1 INJECTION, POWDER, LYOPHILIZED, FOR SOLUTION INTRAVENOUS at 06:14

## 2022-04-25 RX ADMIN — VANCOMYCIN HYDROCHLORIDE 2000 MG: 1 INJECTION, POWDER, LYOPHILIZED, FOR SOLUTION INTRAVENOUS at 13:19

## 2022-04-25 RX ADMIN — VENLAFAXINE HYDROCHLORIDE 150 MG: 150 CAPSULE, EXTENDED RELEASE ORAL at 09:02

## 2022-04-25 RX ADMIN — VENLAFAXINE HYDROCHLORIDE 75 MG: 37.5 CAPSULE, EXTENDED RELEASE ORAL at 09:03

## 2022-04-25 RX ADMIN — LOSARTAN POTASSIUM 100 MG: 50 TABLET, FILM COATED ORAL at 09:02

## 2022-04-25 RX ADMIN — INSULIN LISPRO 2 UNITS: 100 INJECTION, SOLUTION INTRAVENOUS; SUBCUTANEOUS at 11:31

## 2022-04-25 NOTE — ASSESSMENT & PLAN NOTE
Recent Labs     04/23/22  1633 04/24/22  0503 04/25/22  0525   K 3 3* 3 5 3 2*     Recent Labs     04/25/22  0525   MG 2 2     Potassium replete 40 mg b i d  for 2 doses

## 2022-04-25 NOTE — PLAN OF CARE
Problem: Potential for Falls  Goal: Patient will remain free of falls  Description: INTERVENTIONS:  - Educate patient/family on patient safety including physical limitations  - Instruct patient to call for assistance with activity   - Consult OT/PT to assist with strengthening/mobility   - Keep Call bell within reach  - Keep bed low and locked with side rails adjusted as appropriate  - Keep care items and personal belongings within reach  - Initiate and maintain comfort rounds  - Make Fall Risk Sign visible to staff  - Offer Toileting every  Hours, in advance of need  - Initiate/Maintain alarm  - Obtain necessary fall risk management equipment:   - Apply yellow socks and bracelet for high fall risk patients  - Consider moving patient to room near nurses station  Outcome: Progressing     Problem: PAIN - ADULT  Goal: Verbalizes/displays adequate comfort level or baseline comfort level  Description: Interventions:  - Encourage patient to monitor pain and request assistance  - Assess pain using appropriate pain scale  - Administer analgesics based on type and severity of pain and evaluate response  - Implement non-pharmacological measures as appropriate and evaluate response  - Consider cultural and social influences on pain and pain management  - Notify physician/advanced practitioner if interventions unsuccessful or patient reports new pain  Outcome: Progressing     Problem: INFECTION - ADULT  Goal: Absence or prevention of progression during hospitalization  Description: INTERVENTIONS:  - Assess and monitor for signs and symptoms of infection  - Monitor lab/diagnostic results  - Monitor all insertion sites, i e  indwelling lines, tubes, and drains  - Monitor endotracheal if appropriate and nasal secretions for changes in amount and color  - [employer] appropriate cooling/warming therapies per order  - Administer medications as ordered  - Instruct and encourage patient and family to use good hand hygiene technique  - Identify and instruct in appropriate isolation precautions for identified infection/condition  Outcome: Progressing  Goal: Absence of fever/infection during neutropenic period  Description: INTERVENTIONS:  - Monitor WBC    Outcome: Progressing     Problem: SAFETY ADULT  Goal: Patient will remain free of falls  Description: INTERVENTIONS:  - Educate patient/family on patient safety including physical limitations  - Instruct patient to call for assistance with activity   - Consult OT/PT to assist with strengthening/mobility   - Keep Call bell within reach  - Keep bed low and locked with side rails adjusted as appropriate  - Keep care items and personal belongings within reach  - Initiate and maintain comfort rounds  - Make Fall Risk Sign visible to staff  - Offer Toileting every Hours, in advance of need  - Initiate/Maintain alarm  - Obtain necessary fall risk management equipment:   - Apply yellow socks and bracelet for high fall risk patients  - Consider moving patient to room near nurses station  Outcome: Progressing  Goal: Maintain or return to baseline ADL function  Description: INTERVENTIONS:  -  Assess patient's ability to carry out ADLs; assess patient's baseline for ADL function and identify physical deficits which impact ability to perform ADLs (bathing, care of mouth/teeth, toileting, grooming, dressing, etc )  - Assess/evaluate cause of self-care deficits   - Assess range of motion  - Assess patient's mobility; develop plan if impaired  - Assess patient's need for assistive devices and provide as appropriate  - Encourage maximum independence but intervene and supervise when necessary  - Involve family in performance of ADLs  - Assess for home care needs following discharge   - Consider OT consult to assist with ADL evaluation and planning for discharge  - Provide patient education as appropriate  Outcome: Progressing  Goal: Maintains/Returns to pre admission functional level  Description: INTERVENTIONS:  - Perform BMAT or MOVE assessment daily    - Set and communicate daily mobility goal to care team and patient/family/caregiver  - Collaborate with rehabilitation services on mobility goals if consulted  - Perform Range of Motion  times a day  - Reposition patient every  hours  - Dangle patient times a day  - Stand patient  times a day  - Ambulate patient  times a day  - Out of bed to chair  times a day   - Out of bed for meals  times a day  - Out of bed for toileting  - Record patient progress and toleration of activity level   Outcome: Progressing     Problem: DISCHARGE PLANNING  Goal: Discharge to home or other facility with appropriate resources  Description: INTERVENTIONS:  - Identify barriers to discharge w/patient and caregiver  - Arrange for needed discharge resources and transportation as appropriate  - Identify discharge learning needs (meds, wound care, etc )  - Arrange for interpretive services to assist at discharge as needed  - Refer to Case Management Department for coordinating discharge planning if the patient needs post-hospital services based on physician/advanced practitioner order or complex needs related to functional status, cognitive ability, or social support system  Outcome: Progressing     Problem: Knowledge Deficit  Goal: Patient/family/caregiver demonstrates understanding of disease process, treatment plan, medications, and discharge instructions  Description: Complete learning assessment and assess knowledge base    Interventions:  - Provide teaching at level of understanding  - Provide teaching via preferred learning methods  Outcome: Progressing

## 2022-04-25 NOTE — DISCHARGE SUMMARY
[facility]  Discharge- Stephan Faster 1975, 51 y o  male MRN: [id number]  Unit/Bed#: W -01 Encounter: [id number]  Primary Care Provider: Giselle Turner MD   Date and time admitted to hospital: 4/23/2022  3:51 PM    * Cellulitis  Assessment & Plan  Patient is noted to have bilateral lower leg edema edema, erythematous rash, warmth for 5 day history up to the mid calf level  No systemic symptoms such as fever, chills  WBC 13 68 on admission  Of note patient was admitted for cellulitis on 11/20/2020  Patient was on IV vancomycin and discharged on doxycycline  Etiology:  Cellulitis worse on the right leg>Left  Today on exam, erythema with big improvement  Patient denies pain, fever, chills  Blood cultures showed no growth after 24 hours  Plan: Today 4/25 patient will have 2 doses of IV vancomycin  and after discharge patient will take 1 dose of doxycycline 100 mg in the evening and starting tomorrow will continue with doxycycline 100 mg b i d  to complete at total of 10 days of antibiotic course  Last day May 2, 2022  Elevate affected of extremities  IVF   Blood cultures x2 pending    Hypokalemia  Assessment & Plan  Recent Labs     04/23/22  1633 04/24/22  0503 04/25/22  0525   K 3 3* 3 5 3 2*     Recent Labs     04/25/22  0525   MG 2 2     Potassium replete 40 mg b i d  for 2 doses          Ankylosing spondylitis (HCC)  Assessment & Plan  Takes Humira every 14 days  Type 2 diabetes mellitus, without long-term current use of insulin [facility])  Assessment & Plan  Lab Results   Component Value Date    HGBA1C 7 3 (H) 04/23/2022       Recent Labs     04/24/22  1604 04/24/22  2123 04/25/22  0659 04/25/22  1130   POCGLU 146* 187* 219* 159*       Blood Sugar Average: Last 72 hrs:  (P) 298 [id number] home meds:  Metformin 500 mg b i d  Plan:    Continue taking metformin 500 mg b i d    Follow-up with PCP in 1 week  Diabetic diet    Class 3 severe obesity with body mass index (BMI) of 40 0 to 44 9 in adult [facility])  Assessment & Plan    Concerned that venlafaxine could be the reason of weight gain  Patient gained about 100 lb in the last year  Plan:  Lifestyle modification  Recommended follow-up with PCP for readjustment medication for depression  Opioid dependence  Assessment & Plan  Patient was previous heroin user, last use was 2 years prior  Patient is noted to take 160 mg of methadone daily  Reviewed PDMP and saw no note of this  [facility] the patient is taking 160 mg daily  Patient received 160 mg methadone for 1 dose on 04/24/2022  Patient will resume home medication after discharge        Depression  Assessment & Plan  Plan:  Continue venlafaxine    Dyslipidemia  Assessment & Plan  Plan:  Continue to take after Avastin, fenofibrate    Essential hypertension  Assessment & Plan  Plan:  Continue to take HCTZ 20 mg once daily, losartan 100 mg once daily, amlodipine/atorvastatin 10-10 mg, labetalol 100 mg b i d  Medical Problems             Resolved Problems  Date Reviewed: 4/24/2022    None              Discharging Resident: Sarah Owen MD   Discharging Attending: Michelle Smith  PCP: Jarret Tripp MD  Admission Date:   Admission Orders (From admission, onward)     Ordered        04/23/22 1809  INPATIENT ADMISSION  Once                      Discharge Date: 04/25/22    Consultations During Hospital Stay:  · None    Procedures Performed:   · None    Significant Findings / Test Results:   · CBC remarkable for leukocytosis  · INR normal, blood cultures after 24 hours no growth, lactic acid normal  · Hemoglobin A1c 7 3  · Potassium 3 3, creatinine 0 69      Incidental Findings:   · None    Test Results Pending at Discharge (will require follow up): · None     Outpatient Tests Requested:  · None    Complications:  None    Reason for Admission:  Bilateral lower extremity cellulitis    Hospital Course:   Linsey Rossi is a 51 y o  male patient who originally presented to the hospital on 4/23/2022 due to cellulitis of the lower extremity bilateral   Patient did not experience fever, chills  Patient presented with elevated WBC and he was started on IV vancomycin 2 g q i d  After 2 days of antibiotic therapy patient with visible improvement  Erythema was receding from demarked line, nontender  Patient denied chills or fever overnight  Patient was clinically stable for discharge  Was discharged on oral doxycycline 100 mg b i d  To complete and ten day a course of antibiotic therapy the last day be 05/02/2022  During admission patient received 1 dose of methadone 160 mg on 04/24/2022  Patient is on methadone treatment due to known history of heroin abuse  Patient will resume home medication after discharge  The patient was treated with sliding scale for diabetes mellitus and will resume home dose metformin after discharge  Recommended PCP follow-up after discharge  Home medication for hypertension was controlled were continued  Please see above list of diagnoses and related plan for additional information  Condition at Discharge: good    Discharge Day Visit / Exam:   Subjective: On assessment patient reported feeling much better with visible improvement of the erythema on both lower extremity  Patient denied local tenderness, fever, chills, headache, blurry vision,     Vitals: Blood Pressure: 152/76 (04/25/22 0945)  Pulse: 64 (04/25/22 0945)  Temperature: 98 4 °F (36 9 °C) (04/25/22 0703)  Temp Source: Oral (04/23/22 1510)  Respirations: 17 (04/25/22 0703)  Height: 5' 11" (180 3 cm) (04/23/22 1510)  Weight - Scale: (!) 150 kg (330 lb) (04/23/22 1510)  SpO2: 92 % (04/25/22 0945)  Exam:   Physical Exam  Vitals and nursing note reviewed  Constitutional:       General: He is not in acute distress  Appearance: He is well-developed  He is obese  He is not ill-appearing  HENT:      Head: Normocephalic and atraumatic  Eyes:      Conjunctiva/sclera: Conjunctivae normal    Cardiovascular:      Rate and Rhythm: Normal rate and regular rhythm  Heart sounds: No murmur heard  Pulmonary:      Effort: Pulmonary effort is normal  No respiratory distress  Breath sounds: Normal breath sounds  Abdominal:      General: Bowel sounds are normal       Palpations: Abdomen is soft  Tenderness: There is no abdominal tenderness  Musculoskeletal:         General: No swelling or tenderness  Cervical back: Neck supple  Right lower leg: No edema  Left lower leg: No edema  Skin:     General: Skin is warm and dry  Findings: No erythema  Neurological:      General: No focal deficit present  Mental Status: He is alert and oriented to person, place, and time  Mental status is at baseline  Psychiatric:         Mood and Affect: Mood normal          Behavior: Behavior normal          Thought Content: Thought content normal          Judgment: Judgment normal          Discussion with Family: Patient declined call to   Discharge instructions/Information to patient and family:   See after visit summary for information provided to patient and family  Provisions for Follow-Up Care:  See after visit summary for information related to follow-up care and any pertinent home health orders  Disposition:   Home    Planned Readmission:  None       Discharge Medications:  See after visit summary for reconciled discharge medications provided to patient and/or family        **Please Note: This note may have been constructed using a voice recognition system**

## 2022-04-25 NOTE — ASSESSMENT & PLAN NOTE
Concerned that venlafaxine could be the reason of weight gain  Patient gained about 100 lb in the last year  Plan:  Lifestyle modification  Recommended follow-up with PCP for readjustment medication for depression

## 2022-04-25 NOTE — ASSESSMENT & PLAN NOTE
Lab Results   Component Value Date    HGBA1C 7 3 (H) 04/23/2022       Recent Labs     04/24/22  1604 04/24/22  2123 04/25/22  0659 04/25/22  1130   POCGLU 146* 187* 219* 159*       Blood Sugar Average: Last 72 hrs:  (P) 801 [id number] home meds:  Metformin 500 mg b i d  Plan:    Continue taking metformin 500 mg b i d    Follow-up with PCP in 1 week  Diabetic diet

## 2022-04-25 NOTE — DISCHARGE INSTR - AVS FIRST PAGE
Dear Mark Johnson,     [facility]  It is our hope that we were always able to exceed the expected standards for your care during your stay  You were hospitalized due to bilateral lower extremities cellulitis  You were cared for on the 4 floor by Paula Woo MD under the service of Rosemary Diop MD with the [facility] who covers for your primary care physician (PCP), Tawanna Frankel, MD, while you were hospitalized  If you have any questions or concerns related to this hospitalization, you may contact us at [phone]  For follow up as well as any medication refills, we recommend that you follow up with your primary care physician  A registered nurse will reach out to you by phone within a few days after your discharge to answer any additional questions that you may have after going home  However, at this time we provide for you here, the most important instructions / recommendations at discharge:     Notable Medication Adjustments -   Start taking Doxycycline 100 mg every 12 hours today 4/25/22 at 10:00 pm until Monday 5/2/22 (Monday being your last day) to complete a total course of 10 days of AB  Testing Required after Discharge -   None  Important follow up information -   Follow-up with the PCP in 1 week  Other Instructions -   Patient received 1 dose of methadone 160 mg on 4/24/22 at 11:37 am    Please review this entire after visit summary as additional general instructions including medication list, appointments, activity, diet, any pertinent wound care, and other additional recommendations from your care team that may be provided for you        Sincerely,     Paula Woo MD

## 2022-04-25 NOTE — PROGRESS NOTES
Vancomycin Assessment    Kiki Holden is a 51 y o  male who is currently receiving vancomycin 1500mg Q 8hrs for skin-soft tissue infection     Relevant clinical data and objective history reviewed:  Creatinine   Date Value Ref Range Status   04/24/2022 0 67 0 60 - 1 30 mg/dL Final     Comment:     Standardized to IDMS reference method   04/23/2022 0 69 0 60 - 1 30 mg/dL Final     Comment:     Standardized to IDMS reference method   11/19/2020 0 89 0 60 - 1 30 mg/dL Final     Comment:     Standardized to IDMS reference method     /68   Pulse 69   Temp 98 3 °F (36 8 °C)   Resp 18   Ht 5' 11" (1 803 m)   Wt (!) 150 kg (330 lb)   SpO2 94%   BMI 46 03 kg/m²   I/O last 3 completed shifts: In: 1091 7 [I V :541 7; IV Piggyback:550]  Out: -   Lab Results   Component Value Date/Time    BUN 11 04/24/2022 05:03 AM    WBC 12 27 (H) 04/24/2022 05:03 AM    HGB 13 7 04/24/2022 05:03 AM    HCT 43 7 04/24/2022 05:03 AM    MCV 91 04/24/2022 05:03 AM     (H) 04/24/2022 05:03 AM     Temp Readings from Last 3 Encounters:   04/24/22 98 3 °F (36 8 °C)   11/20/20 98 5 °F (36 9 °C) (Oral)   11/06/20 98 °F (36 7 °C)     Vancomycin Days of Therapy: 3    Assessment/Plan  The patient is currently on vancomycin utilizing scheduled dosing  The patient is receiving 1500mg Q 8hrs with the most recent vancomycin level being at steady-state and sub-therapeutic based on a goal of 15-20 (appropriate for most indications) ; therefore, after clinical evaluation will be changed to 2000mg Q 8hrs   Pharmacy will continue to follow closely for s/sx of nephrotoxicity, infusion reactions, and appropriateness of therapy  BMP and CBC will be ordered per protocol  Plan for trough as patient approaches steady state, prior to the 4th  dose at approximately 0630 on 4/26/22  Pharmacy will continue to follow the patients culture results and clinical progress daily      Aiden Barrera, Pharmacist

## 2022-04-25 NOTE — UTILIZATION REVIEW
Initial Clinical Review    Admission: Date/Time/Statement:   Admission Orders (From admission, onward)     Ordered        04/23/22 1809  INPATIENT ADMISSION  Once                      Orders Placed This Encounter   Procedures    INPATIENT ADMISSION     Standing Status:   Standing     Number of Occurrences:   1     Order Specific Question:   Level of Care     Answer:   Med Surg [16]     Order Specific Question:   Estimated length of stay     Answer:   More than 2 Midnights     Order Specific Question:   Certification     Answer:   I certify that inpatient services are medically necessary for this patient for a duration of greater than two midnights  See H&P and MD Progress Notes for additional information about the patient's course of treatment  ED Arrival Information     Expected Arrival Acuity    - 4/23/2022 14:47 Urgent         Means of arrival Escorted by Service Admission type    Walk-In Self Hospitalist Urgent         Arrival complaint    FOOT SWELLING        Chief Complaint   Patient presents with    Foot Swelling     bilat  +erythema, and pain  x1week       Initial Presentation: 51 y o  male with hx DM2  HTN, HLD, depression, alkalosing spondylitis-Humira q14 days, opioid dependence-methadone daily , cellulitis 11/2020 who presents to ED from home per advise PCP with lower leg rash that started on R ft 5 days ago and progresses up to mid calf area  Rash is  erythematous, with increased swelling, increased pain,associated pruritus  R > L   Pt has RLE edema   Pt with elevated BP  Labs- WBC 13 68 , K 3 3    Pt given dual IV abx in ED  Pt admitted as Inpatient with cellulits BLE  , hypokalemia  Plan - IV Vanco, elevate BLE, f/u blood cultures, replete potassium  BMP in am  SSI with accucheks  Date: 4/24   Day 2:   Lower extremity swelling and redness decreased per patient  Erythema receding from demarcated line  Area nontender    Non pitting BLE edema  Per nsg notes, BLE red, hot  +1 DP pulse bilat    Pt afebrile   Potassium 3 5 today  WBC down to 12 27 today  Continue IV abx- vanco  IVF  [facility] to confirm methadone dosage and frequency        ED Triage Vitals   Temperature Pulse Respirations Blood Pressure SpO2   04/23/22 1510 04/23/22 1510 04/23/22 1510 04/23/22 1510 04/23/22 1510   97 9 °F (36 6 °C) 79 16 (!) 188/93 95 %      Temp Source Heart Rate Source Patient Position - Orthostatic VS BP Location FiO2 (%)   04/23/22 1510 04/23/22 1510 04/23/22 1510 04/23/22 1510 --   Oral Monitor Sitting Left arm       Pain Score       04/23/22 2028       No Pain          Wt Readings from Last 1 Encounters:   04/23/22 (!) 150 kg (330 lb)     Additional Vital Signs:   Date/Time Temp Pulse Resp BP MAP (mmHg) SpO2   04/25/22 09:45:44 -- 64 -- 152/76 101 92 %   04/25/22 08:14:01 -- 65 -- 176/93 Abnormal  121 93 %   04/25/22 07:03:29 98 4 °F (36 9 °C) 72 17 175/107 Abnormal  130 94 %   04/24/22 2337 -- -- -- -- -- --   04/24/22 20:38:35 98 3 °F (36 8 °C) 69 -- 139/68 92 94 %   04/24/22 20:38:11 98 3 °F (36 8 °C) 72 -- 139/68 92 94 %   04/24/22 15:46:42 97 8 °F (36 6 °C) 71 18 140/71 94 92 %   04/24/22 07:32:26 97 9 °F (36 6 °C) 76 18 146/84 105 92 %   04/23/22 2100 -- -- -- -- -- --   04/23/22 20:05:14 98 °F (36 7 °C) 66 20 124/72 89 93 %   04/23/22 1700 -- 74 16 142/87 108 95 %       Pertinent Labs/Diagnostic Test Results:         Results from last 7 days   Lab Units 04/25/22  0525 04/24/22  0503 04/23/22  1633   WBC Thousand/uL 10 55* 12 27* 13 68*   HEMOGLOBIN g/dL 13 1 13 7 13 7   HEMATOCRIT % 41 9 43 7 43 8   PLATELETS Thousands/uL 375 392* 392*   NEUTROS ABS Thousands/µL  --   --  8 20*         Results from last 7 days   Lab Units 04/25/22  0525 04/24/22  0503 04/23/22  1633   SODIUM mmol/L 140 142 140   POTASSIUM mmol/L 3 2* 3 5 3 3*   CHLORIDE mmol/L 103 104 99*   CO2 mmol/L 30 30 32   ANION GAP mmol/L 7 8 9   BUN mg/dL 10 11 13   CREATININE mg/dL 0 70 0 67 0 69   EGFR ml/min/1 73sq m 112 114 113   CALCIUM mg/dL 8 2* 9 0 9 1   MAGNESIUM mg/dL 2 2  --   --      Results from last 7 days   Lab Units 04/23/22  1633   AST U/L 21   ALT U/L 50   ALK PHOS U/L 99   TOTAL PROTEIN g/dL 7 9   ALBUMIN g/dL 3 5   TOTAL BILIRUBIN mg/dL 0 29     Results from last 7 days   Lab Units 04/25/22  0659 04/24/22  2123 04/24/22  1604 04/24/22  1124 04/24/22  0732   POC GLUCOSE mg/dl 219* 187* 146* 138 125     Results from last 7 days   Lab Units 04/25/22  0525 04/24/22  0503 04/23/22  1633   GLUCOSE RANDOM mg/dL 270* 120 109         Results from last 7 days   Lab Units 04/23/22  1633   HEMOGLOBIN A1C % 7 3*   EAG mg/dl 163             Results from last 7 days   Lab Units 04/23/22  1633   PROTIME seconds 12 8   INR  0 96   PTT seconds 45*             Results from last 7 days   Lab Units 04/23/22  1633   LACTIC ACID mmol/L 1 1               Results from last 7 days   Lab Units 04/23/22  1659 04/23/22  1633   BLOOD CULTURE  No Growth at 24 hrs  No Growth at 24 hrs                 ED Treatment:   Medication Administration from 04/23/2022 1447 to 04/23/2022 1953       Date/Time Order Dose Route Action     04/23/2022 1707 ceFAZolin (ANCEF) IVPB (premix in dextrose) 2,000 mg 50 mL 2,000 mg Intravenous New Bag     04/23/2022 1757 vancomycin (VANCOCIN) 2,000 mg in sodium chloride 0 9 % 500 mL IVPB 2,000 mg Intravenous New Bag        Past Medical History:   Diagnosis Date    Asthma      Present on Admission:   Depression   Opioid dependence   Essential hypertension   Dyslipidemia   Ankylosing spondylitis (HCC)   Type 2 diabetes mellitus, without long-term current use of insulin (HCC)      Admitting Diagnosis: Hypokalemia [E87 6]  Foot swelling [M79 89]  Bilateral lower leg cellulitis [L03 116, L03 115]  Age/Sex: 51 y o  male  Admission Orders:  Scheduled Medications:  amLODIPine-atorvastatin (CADUET 10/10) combo dose, , Oral, Daily  enoxaparin, 40 mg, Subcutaneous, Daily  fenofibrate, 145 mg, Oral, Daily  hydrochlorothiazide, 25 mg, Oral, Daily  insulin lispro, 2-12 Units, Subcutaneous, TID AC  labetalol, 100 mg, Oral, BID  losartan, 100 mg, Oral, Daily  potassium chloride, 40 mEq, Oral, Once  vancomycin, 2,000 mg, Intravenous, Q8H  venlafaxine, 150 mg, Oral, Daily  venlafaxine, 75 mg, Oral, Daily    methadone (DOLOPHINE) tablet 160 mg  Dose: 160 mg  Freq: Once Route: PO  Start: 04/24/22 1130 End: 04/24/22 1137  potassium chloride (K-DUR,KLOR-CON) CR tablet 40 mEq  Dose: 40 mEq  Freq: Once Route: PO  Start: 04/23/22 1815 End: 04/23/22 2035  ketorolac (TORADOL) injection 15 mg  Dose: 15 mg  Freq: Once Route: IV  Start: 04/23/22 1815 End: 04/23/22 2035        Continuous IV Infusions:sodium chloride 0 9 % infusion  Rate: 100 mL/hr Dose: 100 mL/hr  Freq: Continuous Route: IV  Indications of Use: IV Hydration  Last Dose: 100 mL/hr (04/23/22 2036)  Start: 04/23/22 1900 End: 04/25/22 0747     PRN Meds:  acetaminophen, 650 mg, Oral, Q6H PRN      SCD   OOB  poct glucose    IP CONSULT TO PHARMACY    Network Utilization Review Department  ATTENTION: Please call with any questions or concerns to [phone] and carefully listen to the prompts so that you are directed to the right person  All voicemails are confidential   Patience Henderson all requests for admission clinical reviews, approved or denied determinations and any other requests to dedicated fax number below belonging to the campus where the patient is receiving treatment   List of dedicated fax numbers for the Facilities:  [address] [place] [address] DENIALS (Administrative/Medical Necessity) [phone]   [address] (Maternity/NICU/Pediatrics) [address] [facility],[address] Floor [address]  [address] [facility] [place] [address] [address] [facility] [place] [address] [phone]

## 2022-04-25 NOTE — PLAN OF CARE
Problem: Potential for Falls  Goal: Patient will remain free of falls  Description: INTERVENTIONS:  - Educate patient/family on patient safety including physical limitations  - Instruct patient to call for assistance with activity   - Consult OT/PT to assist with strengthening/mobility   - Keep Call bell within reach  - Keep bed low and locked with side rails adjusted as appropriate  - Keep care items and personal belongings within reach  - Initiate and maintain comfort rounds  - Make Fall Risk Sign visible to staff  - Offer Toileting every 2 Hours, in advance of need  - Initiate/Maintain bed alarm  - Apply yellow socks and bracelet for high fall risk patients  - Consider moving patient to room near nurses station  Outcome: Progressing     Problem: PAIN - ADULT  Goal: Verbalizes/displays adequate comfort level or baseline comfort level  Description: Interventions:  - Encourage patient to monitor pain and request assistance  - Assess pain using appropriate pain scale  - Administer analgesics based on type and severity of pain and evaluate response  - Implement non-pharmacological measures as appropriate and evaluate response  - Consider cultural and social influences on pain and pain management  - Notify physician/advanced practitioner if interventions unsuccessful or patient reports new pain  Outcome: Progressing     Problem: SAFETY ADULT  Goal: Patient will remain free of falls  Description: INTERVENTIONS:  - Educate patient/family on patient safety including physical limitations  - Instruct patient to call for assistance with activity   - Consult OT/PT to assist with strengthening/mobility   - Keep Call bell within reach  - Keep bed low and locked with side rails adjusted as appropriate  - Keep care items and personal belongings within reach  - Initiate and maintain comfort rounds  - Make Fall Risk Sign visible to staff  - Offer Toileting every 2 Hours, in advance of need  - Initiate/Maintain bed alarm  - Apply yellow socks and bracelet for high fall risk patients  - Consider moving patient to room near nurses station  Outcome: Progressing

## 2022-04-25 NOTE — ASSESSMENT & PLAN NOTE
Patient is noted to have bilateral lower leg edema edema, erythematous rash, warmth for 5 day history up to the mid calf level  No systemic symptoms such as fever, chills  WBC 13 68 on admission  Of note patient was admitted for cellulitis on 11/20/2020  Patient was on IV vancomycin and discharged on doxycycline  Etiology:  Cellulitis worse on the right leg>Left  Today on exam, erythema with big improvement  Patient denies pain, fever, chills  Blood cultures showed no growth after 24 hours  Plan: Today 4/25 patient will have 2 doses of IV vancomycin  and after discharge patient will take 1 dose of doxycycline 100 mg in the evening and starting tomorrow will continue with doxycycline 100 mg b i d  to complete at total of 10 days of antibiotic course  Last day May 2, 2022    Elevate affected of extremities  IVF   Blood cultures x2 pending

## 2022-04-25 NOTE — ASSESSMENT & PLAN NOTE
Patient was previous heroin user, last use was 2 years prior  Patient is noted to take 160 mg of methadone daily  Reviewed PDMP and saw no note of this  Confirmed with [facility] the patient is taking 160 mg daily  Patient received 160 mg methadone for 1 dose on 04/24/2022    Patient will resume home medication after discharge - - -

## 2022-04-26 NOTE — UTILIZATION REVIEW
Notification of Discharge   This is a Notification of Discharge from our facility [address]  Please be advised that this patient has been discharge from our facility  Below you will find the admission and discharge date and time including the patients disposition  UTILIZATION REVIEW CONTACT:  Taffy Lefort  Utilization   Network Utilization Review Department  Phone: [phone] x carefully listen to the prompts  All voicemails are confidential   Email: [email or web address]     PHYSICIAN ADVISORY SERVICES:  FOR [id number]-WX-JFGB REVIEW - MEDICAL NECESSITY DENIAL  Phone: [phone]  Fax: [phone]  Email: [email or web address]     PRESENTATION DATE: 4/23/2022  3:51 PM  OBERVATION ADMISSION DATE:   INPATIENT ADMISSION DATE: 4/23/22  6:09 PM   DISCHARGE DATE: 4/25/2022  4:02 PM  DISPOSITION: Home/Self Care Home/Self Care      IMPORTANT INFORMATION:  Send all requests for admission clinical reviews, approved or denied determinations and any other requests to dedicated fax number below belonging to the campus where the patient is receiving treatment   List of dedicated fax numbers:  [address] DENIALS (Administrative/Medical Necessity) [phone]   [address] (Maternity/NICU/Pediatrics) [phone]   [facility] [phone]   130 [facility] [phone]   [address] [place] [address] [phone]   [address] [place] [address] [phone]   [facility]  [phone]   [address] [facility]  2401 [facility] 1000 [address] [facility] [phone]

## 2022-04-29 LAB
BACTERIA BLD CULT: NORMAL
BACTERIA BLD CULT: NORMAL

## 2022-05-17 PROCEDURE — 88305 TISSUE EXAM BY PATHOLOGIST: CPT | Performed by: PATHOLOGY

## 2022-05-18 ENCOUNTER — LAB REQUISITION (OUTPATIENT)
Dept: LAB | Facility: HOSPITAL | Age: 47
End: 2022-05-18
Payer: COMMERCIAL

## 2022-05-18 DIAGNOSIS — D48.5 NEOPLASM OF UNCERTAIN BEHAVIOR OF SKIN: ICD-10-CM

## 2022-06-02 PROCEDURE — 88305 TISSUE EXAM BY PATHOLOGIST: CPT | Performed by: PATHOLOGY

## 2022-06-03 ENCOUNTER — LAB REQUISITION (OUTPATIENT)
Dept: LAB | Facility: HOSPITAL | Age: 47
End: 2022-06-03
Payer: COMMERCIAL

## 2022-06-03 DIAGNOSIS — L72.3 SEBACEOUS CYST: ICD-10-CM

## 2022-06-16 PROCEDURE — 88341 IMHCHEM/IMCYTCHM EA ADD ANTB: CPT | Performed by: PATHOLOGY

## 2022-06-16 PROCEDURE — 88305 TISSUE EXAM BY PATHOLOGIST: CPT | Performed by: PATHOLOGY

## 2022-06-16 PROCEDURE — 88342 IMHCHEM/IMCYTCHM 1ST ANTB: CPT | Performed by: PATHOLOGY

## 2022-06-17 ENCOUNTER — LAB REQUISITION (OUTPATIENT)
Dept: LAB | Facility: HOSPITAL | Age: 47
End: 2022-06-17
Payer: COMMERCIAL

## 2022-06-17 DIAGNOSIS — D48.5 NEOPLASM OF UNCERTAIN BEHAVIOR OF SKIN: ICD-10-CM

## 2022-08-16 ENCOUNTER — PROBLEM (OUTPATIENT)
Dept: URBAN - METROPOLITAN AREA CLINIC 6 | Facility: CLINIC | Age: 47
End: 2022-08-16

## 2022-08-16 DIAGNOSIS — H10.013: ICD-10-CM

## 2022-08-16 PROCEDURE — 92012 INTRM OPH EXAM EST PATIENT: CPT

## 2022-08-16 ASSESSMENT — VISUAL ACUITY
OS_PH: 20/50+2
OD_SC: 20/200
OD_PH: 20/60
OS_SC: 20/80

## 2022-08-16 ASSESSMENT — TONOMETRY
OD_IOP_MMHG: 21
OS_IOP_MMHG: 21

## (undated) RX ORDER — PREDNISOLONE ACETATE 10 MG/ML: 1 SUSPENSION/ DROPS OPHTHALMIC

## (undated) RX ORDER — DUREZOL 0.5 MG/ML: 1 EMULSION OPHTHALMIC